# Patient Record
Sex: FEMALE | Race: WHITE | NOT HISPANIC OR LATINO | Employment: FULL TIME
[De-identification: names, ages, dates, MRNs, and addresses within clinical notes are randomized per-mention and may not be internally consistent; named-entity substitution may affect disease eponyms.]

---

## 2017-08-05 ENCOUNTER — HEALTH MAINTENANCE LETTER (OUTPATIENT)
Age: 52
End: 2017-08-05

## 2018-08-12 ENCOUNTER — HEALTH MAINTENANCE LETTER (OUTPATIENT)
Age: 53
End: 2018-08-12

## 2022-07-16 ENCOUNTER — ANESTHESIA (OUTPATIENT)
Dept: EMERGENCY MEDICINE | Facility: CLINIC | Age: 57
End: 2022-07-16
Payer: COMMERCIAL

## 2022-07-16 ENCOUNTER — HOSPITAL ENCOUNTER (EMERGENCY)
Facility: CLINIC | Age: 57
Discharge: ANOTHER HOSPITAL | End: 2022-07-16
Attending: FAMILY MEDICINE | Admitting: FAMILY MEDICINE
Payer: COMMERCIAL

## 2022-07-16 ENCOUNTER — ANESTHESIA EVENT (OUTPATIENT)
Dept: EMERGENCY MEDICINE | Facility: CLINIC | Age: 57
End: 2022-07-16
Payer: COMMERCIAL

## 2022-07-16 ENCOUNTER — HOSPITAL ENCOUNTER (INPATIENT)
Facility: CLINIC | Age: 57
LOS: 3 days | Discharge: HOME | DRG: 468 | End: 2022-07-20
Attending: INTERNAL MEDICINE | Admitting: INTERNAL MEDICINE
Payer: COMMERCIAL

## 2022-07-16 ENCOUNTER — APPOINTMENT (OUTPATIENT)
Dept: GENERAL RADIOLOGY | Facility: CLINIC | Age: 57
End: 2022-07-16
Attending: FAMILY MEDICINE
Payer: COMMERCIAL

## 2022-07-16 VITALS
HEIGHT: 63 IN | OXYGEN SATURATION: 99 % | SYSTOLIC BLOOD PRESSURE: 150 MMHG | BODY MASS INDEX: 37.03 KG/M2 | DIASTOLIC BLOOD PRESSURE: 90 MMHG | WEIGHT: 209 LBS | TEMPERATURE: 98.8 F | HEART RATE: 72 BPM | RESPIRATION RATE: 12 BRPM

## 2022-07-16 DIAGNOSIS — S83.124A: ICD-10-CM

## 2022-07-16 DIAGNOSIS — T84.012A FAILED TOTAL KNEE, RIGHT, INITIAL ENCOUNTER (H): Primary | ICD-10-CM

## 2022-07-16 PROBLEM — S83.106A: Status: ACTIVE | Noted: 2022-07-16

## 2022-07-16 LAB
ALBUMIN SERPL-MCNC: 3.4 G/DL (ref 3.5–5)
ALP SERPL-CCNC: 53 U/L (ref 45–120)
ALT SERPL W P-5'-P-CCNC: 15 U/L (ref 0–45)
ANION GAP SERPL CALCULATED.3IONS-SCNC: 8 MMOL/L (ref 5–18)
AST SERPL W P-5'-P-CCNC: 28 U/L (ref 0–40)
BASOPHILS # BLD AUTO: 0 10E3/UL (ref 0–0.2)
BASOPHILS NFR BLD AUTO: 0 %
BILIRUB SERPL-MCNC: 0.9 MG/DL (ref 0–1)
BUN SERPL-MCNC: 13 MG/DL (ref 8–22)
CALCIUM SERPL-MCNC: 8.4 MG/DL (ref 8.5–10.5)
CHLORIDE BLD-SCNC: 105 MMOL/L (ref 98–107)
CO2 SERPL-SCNC: 28 MMOL/L (ref 22–31)
CREAT SERPL-MCNC: 0.78 MG/DL (ref 0.6–1.1)
EOSINOPHIL # BLD AUTO: 0 10E3/UL (ref 0–0.7)
EOSINOPHIL NFR BLD AUTO: 0 %
ERYTHROCYTE [DISTWIDTH] IN BLOOD BY AUTOMATED COUNT: 12.9 % (ref 10–15)
GFR SERPL CREATININE-BSD FRML MDRD: 89 ML/MIN/1.73M2
GLUCOSE BLD-MCNC: 93 MG/DL (ref 70–125)
HCT VFR BLD AUTO: 32.4 % (ref 35–47)
HGB BLD-MCNC: 10.6 G/DL (ref 11.7–15.7)
IMM GRANULOCYTES # BLD: 0.1 10E3/UL
IMM GRANULOCYTES NFR BLD: 1 %
LYMPHOCYTES # BLD AUTO: 3.3 10E3/UL (ref 0.8–5.3)
LYMPHOCYTES NFR BLD AUTO: 33 %
MAGNESIUM SERPL-MCNC: 1.7 MG/DL (ref 1.8–2.6)
MCH RBC QN AUTO: 29.2 PG (ref 26.5–33)
MCHC RBC AUTO-ENTMCNC: 32.7 G/DL (ref 31.5–36.5)
MCV RBC AUTO: 89 FL (ref 78–100)
MONOCYTES # BLD AUTO: 0.5 10E3/UL (ref 0–1.3)
MONOCYTES NFR BLD AUTO: 5 %
NEUTROPHILS # BLD AUTO: 6 10E3/UL (ref 1.6–8.3)
NEUTROPHILS NFR BLD AUTO: 61 %
NRBC # BLD AUTO: 0 10E3/UL
NRBC BLD AUTO-RTO: 0 /100
PLATELET # BLD AUTO: 166 10E3/UL (ref 150–450)
POTASSIUM BLD-SCNC: 4.2 MMOL/L (ref 3.5–5)
PROT SERPL-MCNC: 6.4 G/DL (ref 6–8)
RBC # BLD AUTO: 3.63 10E6/UL (ref 3.8–5.2)
SARS-COV-2 RNA RESP QL NAA+PROBE: NEGATIVE
SODIUM SERPL-SCNC: 141 MMOL/L (ref 136–145)
WBC # BLD AUTO: 10 10E3/UL (ref 4–11)

## 2022-07-16 PROCEDURE — 83735 ASSAY OF MAGNESIUM: CPT | Performed by: INTERNAL MEDICINE

## 2022-07-16 PROCEDURE — 27550 TREAT KNEE DISLOCATION: CPT | Mod: RT | Performed by: FAMILY MEDICINE

## 2022-07-16 PROCEDURE — 85025 COMPLETE CBC W/AUTO DIFF WBC: CPT | Performed by: INTERNAL MEDICINE

## 2022-07-16 PROCEDURE — 250N000011 HC RX IP 250 OP 636: Performed by: FAMILY MEDICINE

## 2022-07-16 PROCEDURE — 370N000003 HC ANESTHESIA WARD SERVICE

## 2022-07-16 PROCEDURE — 99285 EMERGENCY DEPT VISIT HI MDM: CPT | Mod: 25 | Performed by: FAMILY MEDICINE

## 2022-07-16 PROCEDURE — 258N000003 HC RX IP 258 OP 636: Performed by: INTERNAL MEDICINE

## 2022-07-16 PROCEDURE — 36415 COLL VENOUS BLD VENIPUNCTURE: CPT | Performed by: INTERNAL MEDICINE

## 2022-07-16 PROCEDURE — C9803 HOPD COVID-19 SPEC COLLECT: HCPCS | Performed by: FAMILY MEDICINE

## 2022-07-16 PROCEDURE — 99223 1ST HOSP IP/OBS HIGH 75: CPT | Mod: AI | Performed by: INTERNAL MEDICINE

## 2022-07-16 PROCEDURE — 999N000065 XR KNEE RT 1 /2 VW

## 2022-07-16 PROCEDURE — G0378 HOSPITAL OBSERVATION PER HR: HCPCS

## 2022-07-16 PROCEDURE — 80053 COMPREHEN METABOLIC PANEL: CPT | Performed by: INTERNAL MEDICINE

## 2022-07-16 PROCEDURE — 250N000011 HC RX IP 250 OP 636: Performed by: NURSE ANESTHETIST, CERTIFIED REGISTERED

## 2022-07-16 PROCEDURE — 87635 SARS-COV-2 COVID-19 AMP PRB: CPT | Performed by: FAMILY MEDICINE

## 2022-07-16 PROCEDURE — 73560 X-RAY EXAM OF KNEE 1 OR 2: CPT | Mod: RT

## 2022-07-16 RX ORDER — HYDROMORPHONE HYDROCHLORIDE 1 MG/ML
0.5 INJECTION, SOLUTION INTRAMUSCULAR; INTRAVENOUS; SUBCUTANEOUS
Status: COMPLETED | OUTPATIENT
Start: 2022-07-16 | End: 2022-07-16

## 2022-07-16 RX ORDER — ONDANSETRON 2 MG/ML
4 INJECTION INTRAMUSCULAR; INTRAVENOUS EVERY 6 HOURS PRN
Status: DISCONTINUED | OUTPATIENT
Start: 2022-07-16 | End: 2022-07-18

## 2022-07-16 RX ORDER — HYDROXYZINE HYDROCHLORIDE 10 MG/1
10 TABLET, FILM COATED ORAL 3 TIMES DAILY PRN
Status: DISCONTINUED | OUTPATIENT
Start: 2022-07-16 | End: 2022-07-19

## 2022-07-16 RX ORDER — AMOXICILLIN 250 MG
1 CAPSULE ORAL 2 TIMES DAILY PRN
Status: DISCONTINUED | OUTPATIENT
Start: 2022-07-16 | End: 2022-07-18

## 2022-07-16 RX ORDER — ACETAMINOPHEN 650 MG/1
650 SUPPOSITORY RECTAL EVERY 6 HOURS PRN
Status: DISCONTINUED | OUTPATIENT
Start: 2022-07-16 | End: 2022-07-18

## 2022-07-16 RX ORDER — BISACODYL 10 MG
10 SUPPOSITORY, RECTAL RECTAL DAILY PRN
Status: DISCONTINUED | OUTPATIENT
Start: 2022-07-16 | End: 2022-07-18

## 2022-07-16 RX ORDER — NALOXONE HYDROCHLORIDE 0.4 MG/ML
0.2 INJECTION, SOLUTION INTRAMUSCULAR; INTRAVENOUS; SUBCUTANEOUS
Status: DISCONTINUED | OUTPATIENT
Start: 2022-07-16 | End: 2022-07-20 | Stop reason: HOSPADM

## 2022-07-16 RX ORDER — ACETAMINOPHEN 500 MG
1000 TABLET ORAL EVERY 6 HOURS PRN
Status: ON HOLD | COMMUNITY
End: 2022-07-20

## 2022-07-16 RX ORDER — ASPIRIN 81 MG/1
81 TABLET, CHEWABLE ORAL DAILY
Status: ON HOLD | COMMUNITY
End: 2022-07-20

## 2022-07-16 RX ORDER — POLYETHYLENE GLYCOL 3350 17 G/17G
1 POWDER, FOR SOLUTION ORAL DAILY
COMMUNITY

## 2022-07-16 RX ORDER — NALOXONE HYDROCHLORIDE 0.4 MG/ML
0.4 INJECTION, SOLUTION INTRAMUSCULAR; INTRAVENOUS; SUBCUTANEOUS
Status: DISCONTINUED | OUTPATIENT
Start: 2022-07-16 | End: 2022-07-20 | Stop reason: HOSPADM

## 2022-07-16 RX ORDER — OXYCODONE HYDROCHLORIDE 5 MG/1
5-10 TABLET ORAL EVERY 4 HOURS PRN
Status: ON HOLD | COMMUNITY
End: 2022-07-20

## 2022-07-16 RX ORDER — ACETAMINOPHEN 325 MG/1
650 TABLET ORAL EVERY 6 HOURS PRN
Status: DISCONTINUED | OUTPATIENT
Start: 2022-07-16 | End: 2022-07-18

## 2022-07-16 RX ORDER — SODIUM CHLORIDE 9 MG/ML
INJECTION, SOLUTION INTRAVENOUS CONTINUOUS
Status: DISCONTINUED | OUTPATIENT
Start: 2022-07-16 | End: 2022-07-17

## 2022-07-16 RX ORDER — IBUPROFEN 800 MG/1
800 TABLET, FILM COATED ORAL EVERY 8 HOURS PRN
COMMUNITY

## 2022-07-16 RX ORDER — OXYCODONE HYDROCHLORIDE 10 MG/1
5-10 TABLET ORAL EVERY 4 HOURS PRN
Status: ON HOLD | COMMUNITY
End: 2022-07-16

## 2022-07-16 RX ORDER — ONDANSETRON 2 MG/ML
4 INJECTION INTRAMUSCULAR; INTRAVENOUS
Status: DISCONTINUED | OUTPATIENT
Start: 2022-07-16 | End: 2022-07-16 | Stop reason: HOSPADM

## 2022-07-16 RX ORDER — ONDANSETRON 4 MG/1
4 TABLET, ORALLY DISINTEGRATING ORAL EVERY 6 HOURS PRN
Status: DISCONTINUED | OUTPATIENT
Start: 2022-07-16 | End: 2022-07-18

## 2022-07-16 RX ORDER — AMOXICILLIN 250 MG
2 CAPSULE ORAL 2 TIMES DAILY PRN
Status: DISCONTINUED | OUTPATIENT
Start: 2022-07-16 | End: 2022-07-17

## 2022-07-16 RX ORDER — HYDROCODONE BITARTRATE AND ACETAMINOPHEN 5; 325 MG/1; MG/1
1 TABLET ORAL EVERY 4 HOURS PRN
Status: DISCONTINUED | OUTPATIENT
Start: 2022-07-16 | End: 2022-07-17

## 2022-07-16 RX ORDER — NICOTINE POLACRILEX 4 MG/1
20 GUM, CHEWING ORAL DAILY
COMMUNITY

## 2022-07-16 RX ORDER — PROPOFOL 10 MG/ML
INJECTION, EMULSION INTRAVENOUS PRN
Status: DISCONTINUED | OUTPATIENT
Start: 2022-07-16 | End: 2022-07-16

## 2022-07-16 RX ORDER — HYDROMORPHONE HYDROCHLORIDE 1 MG/ML
0.3 INJECTION, SOLUTION INTRAMUSCULAR; INTRAVENOUS; SUBCUTANEOUS EVERY 4 HOURS PRN
Status: DISCONTINUED | OUTPATIENT
Start: 2022-07-16 | End: 2022-07-18

## 2022-07-16 RX ADMIN — SODIUM CHLORIDE: 9 INJECTION, SOLUTION INTRAVENOUS at 22:58

## 2022-07-16 RX ADMIN — HYDROMORPHONE HYDROCHLORIDE 0.5 MG: 1 INJECTION, SOLUTION INTRAMUSCULAR; INTRAVENOUS; SUBCUTANEOUS at 18:41

## 2022-07-16 RX ADMIN — HYDROMORPHONE HYDROCHLORIDE 0.5 MG: 1 INJECTION, SOLUTION INTRAMUSCULAR; INTRAVENOUS; SUBCUTANEOUS at 19:00

## 2022-07-16 RX ADMIN — MIDAZOLAM 2 MG: 1 INJECTION INTRAMUSCULAR; INTRAVENOUS at 18:14

## 2022-07-16 RX ADMIN — PROPOFOL 100 MG: 10 INJECTION, EMULSION INTRAVENOUS at 18:17

## 2022-07-16 RX ADMIN — HYDROMORPHONE HYDROCHLORIDE 0.5 MG: 1 INJECTION, SOLUTION INTRAMUSCULAR; INTRAVENOUS; SUBCUTANEOUS at 18:34

## 2022-07-16 NOTE — ANESTHESIA PREPROCEDURE EVALUATION
Anesthesia Pre-Procedure Evaluation    Patient: Kristen Graff   MRN: [id number] : 1965        Procedure : * No procedures listed *          Past Medical History:   Diagnosis Date     Leiomyoma of uterus, unspecified      Unspecified hypothyroidism       Past Surgical History:   Procedure Laterality Date     C/SECTION, LOW TRANSVERSE       D & C       HYSTERECTOMY, PAP NO LONGER INDICATED  2008      No Known Allergies   Social History     Tobacco Use     Smoking status: Never Smoker     Smokeless tobacco: Not on file   Substance Use Topics     Alcohol use: No      Wt Readings from Last 1 Encounters:   22 94.8 kg (209 lb)        Anesthesia Evaluation   Pt has had prior anesthetic. Type: General.        ROS/MED HX  ENT/Pulmonary:  - neg pulmonary ROS     Neurologic:  - neg neurologic ROS     Cardiovascular:       METS/Exercise Tolerance:     Hematologic:  - neg hematologic  ROS     Musculoskeletal:  - neg musculoskeletal ROS     GI/Hepatic:     (+) GERD,     Renal/Genitourinary:  - neg Renal ROS     Endo:     (+) thyroid problem, hypothyroidism,     Psychiatric/Substance Use:  - neg psychiatric ROS     Infectious Disease:  - neg infectious disease ROS     Malignancy:  - neg malignancy ROS     Other:            Physical Exam    Airway        Mallampati: II   TM distance: > 3 FB   Neck ROM: full   Mouth opening: > 3 cm    Respiratory Devices and Support         Dental  no notable dental history         Cardiovascular   cardiovascular exam normal          Pulmonary   pulmonary exam normal                OUTSIDE LABS:  CBC:   Lab Results   Component Value Date    WBC 7.6 2008    HGB 9.9 (L) 01/15/2008    HGB 11.4 (L) 2008    HCT 31.2 (L) 01/15/2008    HCT 36.3 2008     2008     BMP:   Lab Results   Component Value Date     2006     COAGS: No results found for: PTT, INR, FIBR  POC:   Lab Results   Component Value Date    HCG Negative 2008      HEPATIC: No results found for: ALBUMIN, PROTTOTAL, ALT, AST, GGT, ALKPHOS, BILITOTAL, BILIDIRECT, LAUREN  OTHER:   Lab Results   Component Value Date    A1C 5.8 02/08/2010    TSH 0.55 03/21/2011    T4 1.00 01/11/2008       Anesthesia Plan    ASA Status:  2      Anesthesia Type: General.   Induction: Intravenous.           Consents    Anesthesia Plan(s) and associated risks, benefits, and realistic alternatives discussed. Questions answered and patient/representative(s) expressed understanding.    - Discussed:     - Discussed with:  Patient         Postoperative Care    Pain management: IV analgesics, Oral pain medications.   PONV prophylaxis: Ondansetron (or other 5HT-3)     Comments:                CLAYTON Mata CRNA

## 2022-07-16 NOTE — ED PROVIDER NOTES
HPI   The patient is a 57-year-old female presenting with concern for right knee dislocation.  She had an arthroplasty just 3 days ago.  Shortly after the arthroplasty she had a dislocation of the knee.  This came on after she was upright and bending over toward the front at her waist.  It caused her right knee to buckle and hyperextend.  This resulted in a dislocation of the knee arthroplasty with the inferior segment posterior to the superior segment.  The patient tells me that she went back into surgery and a portion of the superior joint was replaced.  Unfortunately, today she was up again and bending over forward when she had recurrent pain and movement of the knee consistent with what she experienced previously.  She took oxycodone this morning.  She had fentanyl in route.  She currently tells me that her pain is increasing and its becoming more uncomfortable.  No other injury reported.        Allergies:  No Known Allergies  Problem List:    Patient Active Problem List    Diagnosis Date Noted     CARDIOVASCULAR SCREENING; LDL GOAL LESS THAN 160 10/31/2010     Priority: Medium     Annual physical exam 08/18/2010     Priority: Medium     Left lower quadrant pain 08/18/2010     Priority: Medium     Esophageal reflux 04/06/2006     Priority: Medium     Hypothyroidism 12/14/2005     Priority: Medium     March 22, 2011 TSH 0.55, on synthyroid 112 mcg, one year refill at current dose.   Problem list name updated by automated process. Provider to review        Past Medical History:    Past Medical History:   Diagnosis Date     Leiomyoma of uterus, unspecified      Unspecified hypothyroidism      Past Surgical History:    Past Surgical History:   Procedure Laterality Date     C/SECTION, LOW TRANSVERSE  1992     D & C  1992     HYSTERECTOMY, PAP NO LONGER INDICATED  1-     Family History:    Family History   Problem Relation Age of Onset     Diabetes Sister         Adult onset     Heart Disease Maternal  "Grandfather          of heart attack     C.A.D. Maternal Grandfather      Breast Cancer Maternal Aunt         Dx in her 50's     Diabetes Father         Adult onset     Eye Disorder Father         some type of retina issue     Thyroid Disease Father      Cancer Paternal Uncle         bladder     Breast Cancer Mother      Diabetes Paternal Uncle      Social History:  Marital Status:   [2]  Social History     Tobacco Use     Smoking status: Never Smoker   Substance Use Topics     Alcohol use: No     Drug use: No      Medications:    acetaminophen (TYLENOL) 500 MG tablet  aspirin (ASA) 81 MG chewable tablet  ibuprofen (ADVIL/MOTRIN) 800 MG tablet  levothyroxine (SYNTHROID) 112 MCG tablet  omeprazole 20 MG tablet  oxyCODONE IR (ROXICODONE) 10 MG tablet  polyethylene glycol (MIRALAX) 17 g packet      Review of Systems   All other systems reviewed and are negative.      PE   BP: (!) 158/82  Pulse: 74  Temp: 98.8  F (37.1  C)  Resp: 14  Height: 160 cm (5' 3\")  Weight: 94.8 kg (209 lb)  SpO2: 96 %  Physical Exam  Vitals reviewed.   Constitutional:       Appearance: She is well-developed.      Comments: Cooperative, pleasant, mild discomfort.  Sitting up in bed with her legs in front of her.  There is a large knee immobilizer on the right.   HENT:      Head: Normocephalic and atraumatic.      Right Ear: External ear normal.      Left Ear: External ear normal.      Nose: Nose normal.      Mouth/Throat:      Mouth: Mucous membranes are moist.      Pharynx: Oropharynx is clear.   Eyes:      Extraocular Movements: Extraocular movements intact.      Conjunctiva/sclera: Conjunctivae normal.      Pupils: Pupils are equal, round, and reactive to light.   Cardiovascular:      Rate and Rhythm: Normal rate and regular rhythm.   Pulmonary:      Effort: Pulmonary effort is normal.   Musculoskeletal:      Cervical back: Normal range of motion.      Comments: There is an obvious deformity involving the right knee, despite the " "knee immobilizer being on the knee.  She has a large amount of swelling on the anterior aspect.  She has tenderness in this area.  The right foot is warm to the touch and has brisk capillary refill.   Skin:     General: Skin is warm and dry.   Neurological:      Mental Status: She is alert and oriented to person, place, and time.   Psychiatric:         Behavior: Behavior normal.         ED COURSE and [facility]   1659.  Concern for knee dislocation.  X-ray pending.  Dilaudid ordered.  I will reach out to [facility] orthopedics prior to reduction.  Her knee surgeon was Dr. Temple.    1830.  I spoke with Dr. Barrientos at [facility].  He recommended reduction and then a posterior splint.  The procedure is documented below.  Postreduction x-ray still pending.  The patient is demanding admission to the hospital because she is unsafe at home.  She did clarify her history and told me that she had her dislocation occur with her knee immobilizer on and without weightbearing.  She was simply trying to bend forward while sitting and loosen the knee immobilizer when it slid out of place.  She lives in a home with stairs.  She does not think she is able to accomplish this and is requesting hospitalization and ultimately definitive treatment.  I think this is reasonable given her situation.    1957.  The patient tried to move in bed when she felt a slide or \"clunk\" in her right knee.  She does have swelling of the knee but it is difficult to appreciate if the knee has dislocated again.  I suspect not.  She is not with significant pain at this time.  Her foot is warm.  Capillary refill is brisk.  No repeat imaging at this time.  The patient is excepted by the hospitalist at [facility], Dr. Ochoa.    LABS  Labs Ordered and Resulted from Time of ED Arrival to Time of ED Departure   COVID-19 VIRUS (CORONAVIRUS) BY PCR - Normal       Result Value    SARS CoV2 PCR Negative         IMAGING  Images reviewed by me.  Radiology report also " reviewed.  XR Knee Right 1/2 Views   Final Result   IMPRESSION: Postoperative changes of total knee arthroplasty and patellar resurfacing. Interval reduction of the dislocation seen on the previous examination. Post procedural air within the joint and surrounding soft tissues.      XR Knee Right 1/2 Views   Final Result   IMPRESSION: Postoperative changes of total knee arthroplasty. Patellar resurfacing. Posterior dislocation at the knee joint. Post procedural air within the joint.          [facility]    -Dislocation - Lower Extremity    Date/Time: 7/16/2022 6:37 PM  Performed by: Jonny Gil MD  Authorized by: Jonny Gil MD     Risks, benefits and alternatives discussed.      LOCATION     Location:  Knee    Knee injury location:  R knee    Knee dislocation type: posterior      PRE PROCEDURE DETAILS:     Distal perfusion: normal      Range of motion: reduced      ANESTHESIA (see MAR for exact dosages)      Anesthesia method: Procedural sedation, anesthesia consultation.    PROCEDURE DETAILS      Manipulation performed: yes      Knee reduction method:  Direct traction    Reduction successful: yes      Reduction confirmed with imaging: yes      Immobilization:  Splint    Splint type: Full-length right lower extremity splint.    Supplies used:  Prefabricated splint    POST PROCEDURE DETAILS      Neurological function: normal      Distal perfusion: normal      Range of motion: improved        PROCEDURE    Patient Tolerance:  Patient tolerated the procedure well with no immediate complications      Medications   ondansetron (ZOFRAN) injection 4 mg (has no administration in time range)   HYDROmorphone (PF) (DILAUDID) injection 0.5 mg (0.5 mg Intravenous Given 7/16/22 1900)         IMPRESSION       ICD-10-CM    1. Posterior disloc of proximal end of tibia, right knee, init  S83.124A             Medication List      There are no discharge medications for this visit.                      Jonny Gil MD  07/16/22 1958

## 2022-07-16 NOTE — ED TRIAGE NOTES
Patient had surgery on 14th right total knee, on the 15th the knee popped back out and today she comes in because the knee is out of joint again. Patient has knee immobilizer on and has noted deformity in the operative knee.     ANNA Temple      Triage Assessment     Row Name 07/16/22 1527       Triage Assessment (Adult)    Airway WDL WDL       Respiratory WDL    Respiratory WDL WDL       Skin Circulation/Temperature WDL    Skin Circulation/Temperature WDL --  right knee surgical incision       Cardiac WDL    Cardiac WDL WDL       Peripheral/Neurovascular WDL    Peripheral Neurovascular WDL pulse assessment    Pulse Assessment posterior tibial;dorsalis pedis       Pulse Dorsalis Pedis    Left Dorsalis Pedis Pulse 2+ (normal)    Right Dorsalis Pedis Pulse 2+ (normal)       Pulse Posterior Tibial    Left Posterior Tibial Pulse 2+ (normal)    Right Posterior Tibial Pulse 2+ (normal)       Cognitive/Neuro/Behavioral WDL    Cognitive/Neuro/Behavioral WDL WDL

## 2022-07-16 NOTE — ANESTHESIA POSTPROCEDURE EVALUATION
Patient: Kristen Graff    Procedure: * No procedures listed *       Anesthesia Type:  General    Note:  Disposition: Outpatient   Postop Pain Control: Uneventful            Sign Out: Well controlled pain   PONV: No   Neuro/Psych: Uneventful            Sign Out: Acceptable/Baseline neuro status   Airway/Respiratory: Uneventful            Sign Out: Acceptable/Baseline resp. status   CV/Hemodynamics: Uneventful            Sign Out: Acceptable CV status; No obvious hypovolemia; No obvious fluid overload   Other NRE: NONE   DID A NON-ROUTINE EVENT OCCUR? No           Last vitals:  Vitals:    07/16/22 1525 07/16/22 1815 07/16/22 1826   BP: (!) 158/82 (!) 150/90    Pulse: 74 72    Resp: 14 10 12   Temp: 37.1  C (98.8  F)     SpO2: 96% 100% 99%       Electronically Signed By: CLAYTON Mata CRNA  July 16, 2022  6:29 PM

## 2022-07-16 NOTE — ANESTHESIA CARE TRANSFER NOTE
Patient: Kristen Graff    Procedure: * No procedures listed *       Diagnosis: * No pre-op diagnosis entered *  Diagnosis Additional Information: No value filed.    Anesthesia Type:   General     Note:      Level of Consciousness: awake  Oxygen Supplementation: nasal cannula    Independent Airway: airway patency satisfactory and stable    Vital Signs Stable: post-procedure vital signs reviewed and stable  Report to RN Given: handoff report given  Patient transferred to: Emergency Department    Handoff Report: Identifed the Patient, Identified the Reponsible Provider, Reviewed the pertinent medical history, Discussed the surgical course, Reviewed Intra-OP anesthesia mangement and issues during anesthesia, Set expectations for post-procedure period and Allowed opportunity for questions and acknowledgement of understanding      Vitals:  Vitals Value Taken Time   /85 07/16/22 1826   Temp     Pulse 75 07/16/22 1826   Resp 12 07/16/22 1826   SpO2 100 % 07/16/22 1828   Vitals shown include unvalidated device data.    Electronically Signed By: CLAYTON Mata CRNA  July 16, 2022  6:29 PM

## 2022-07-17 ENCOUNTER — APPOINTMENT (OUTPATIENT)
Dept: RADIOLOGY | Facility: CLINIC | Age: 57
DRG: 468 | End: 2022-07-17
Attending: INTERNAL MEDICINE
Payer: COMMERCIAL

## 2022-07-17 PROBLEM — Z96.641 STATUS POST TOTAL HIP REPLACEMENT, RIGHT: Status: ACTIVE | Noted: 2022-07-17

## 2022-07-17 PROBLEM — T84.012A FAILED TOTAL KNEE, RIGHT, INITIAL ENCOUNTER (H): Status: ACTIVE | Noted: 2022-07-17

## 2022-07-17 PROBLEM — R03.0 ELEVATED BLOOD PRESSURE READING WITHOUT DIAGNOSIS OF HYPERTENSION: Status: ACTIVE | Noted: 2022-07-17

## 2022-07-17 PROBLEM — Z92.89 H/O ECHOCARDIOGRAM: Status: ACTIVE | Noted: 2022-07-17

## 2022-07-17 PROBLEM — Z96.641 STATUS POST TOTAL HIP REPLACEMENT, RIGHT: Chronic | Status: ACTIVE | Noted: 2022-07-17

## 2022-07-17 PROBLEM — S83.106A: Chronic | Status: ACTIVE | Noted: 2022-07-16

## 2022-07-17 LAB
ALBUMIN SERPL-MCNC: 3.4 G/DL (ref 3.5–5)
ALP SERPL-CCNC: 56 U/L (ref 45–120)
ALT SERPL W P-5'-P-CCNC: 15 U/L (ref 0–45)
ANION GAP SERPL CALCULATED.3IONS-SCNC: 9 MMOL/L (ref 5–18)
AST SERPL W P-5'-P-CCNC: 28 U/L (ref 0–40)
BASOPHILS # BLD AUTO: 0 10E3/UL (ref 0–0.2)
BASOPHILS NFR BLD AUTO: 0 %
BILIRUB SERPL-MCNC: 1.5 MG/DL (ref 0–1)
BUN SERPL-MCNC: 10 MG/DL (ref 8–22)
CALCIUM SERPL-MCNC: 8.7 MG/DL (ref 8.5–10.5)
CHLORIDE BLD-SCNC: 101 MMOL/L (ref 98–107)
CO2 SERPL-SCNC: 28 MMOL/L (ref 22–31)
CREAT SERPL-MCNC: 0.77 MG/DL (ref 0.6–1.1)
EOSINOPHIL # BLD AUTO: 0.1 10E3/UL (ref 0–0.7)
EOSINOPHIL NFR BLD AUTO: 1 %
ERYTHROCYTE [DISTWIDTH] IN BLOOD BY AUTOMATED COUNT: 13 % (ref 10–15)
GFR SERPL CREATININE-BSD FRML MDRD: >90 ML/MIN/1.73M2
GLUCOSE BLD-MCNC: 98 MG/DL (ref 70–125)
HCT VFR BLD AUTO: 33.3 % (ref 35–47)
HGB BLD-MCNC: 10.8 G/DL (ref 11.7–15.7)
HGB BLD-MCNC: 11.7 G/DL (ref 11.7–15.7)
IMM GRANULOCYTES # BLD: 0.1 10E3/UL
IMM GRANULOCYTES NFR BLD: 1 %
LYMPHOCYTES # BLD AUTO: 2.2 10E3/UL (ref 0.8–5.3)
LYMPHOCYTES NFR BLD AUTO: 23 %
MAGNESIUM SERPL-MCNC: 1.7 MG/DL (ref 1.8–2.6)
MCH RBC QN AUTO: 28.8 PG (ref 26.5–33)
MCHC RBC AUTO-ENTMCNC: 32.4 G/DL (ref 31.5–36.5)
MCV RBC AUTO: 89 FL (ref 78–100)
MONOCYTES # BLD AUTO: 0.6 10E3/UL (ref 0–1.3)
MONOCYTES NFR BLD AUTO: 6 %
NEUTROPHILS # BLD AUTO: 6.5 10E3/UL (ref 1.6–8.3)
NEUTROPHILS NFR BLD AUTO: 69 %
NRBC # BLD AUTO: 0 10E3/UL
NRBC BLD AUTO-RTO: 0 /100
PLATELET # BLD AUTO: 163 10E3/UL (ref 150–450)
POTASSIUM BLD-SCNC: 3.8 MMOL/L (ref 3.5–5)
PROT SERPL-MCNC: 6.7 G/DL (ref 6–8)
RBC # BLD AUTO: 3.75 10E6/UL (ref 3.8–5.2)
SODIUM SERPL-SCNC: 138 MMOL/L (ref 136–145)
WBC # BLD AUTO: 9.4 10E3/UL (ref 4–11)

## 2022-07-17 PROCEDURE — 36415 COLL VENOUS BLD VENIPUNCTURE: CPT | Performed by: ORTHOPAEDIC SURGERY

## 2022-07-17 PROCEDURE — 85018 HEMOGLOBIN: CPT | Performed by: ORTHOPAEDIC SURGERY

## 2022-07-17 PROCEDURE — 85025 COMPLETE CBC W/AUTO DIFF WBC: CPT | Performed by: INTERNAL MEDICINE

## 2022-07-17 PROCEDURE — G0378 HOSPITAL OBSERVATION PER HR: HCPCS

## 2022-07-17 PROCEDURE — 36415 COLL VENOUS BLD VENIPUNCTURE: CPT | Performed by: INTERNAL MEDICINE

## 2022-07-17 PROCEDURE — 80053 COMPREHEN METABOLIC PANEL: CPT | Performed by: INTERNAL MEDICINE

## 2022-07-17 PROCEDURE — 96372 THER/PROPH/DIAG INJ SC/IM: CPT | Mod: XU | Performed by: INTERNAL MEDICINE

## 2022-07-17 PROCEDURE — 96374 THER/PROPH/DIAG INJ IV PUSH: CPT | Mod: XU

## 2022-07-17 PROCEDURE — 250N000011 HC RX IP 250 OP 636: Performed by: INTERNAL MEDICINE

## 2022-07-17 PROCEDURE — 99225 PR SUBSEQUENT OBSERVATION CARE,LEVEL II: CPT | Performed by: INTERNAL MEDICINE

## 2022-07-17 PROCEDURE — 250N000013 HC RX MED GY IP 250 OP 250 PS 637: Performed by: INTERNAL MEDICINE

## 2022-07-17 PROCEDURE — 83735 ASSAY OF MAGNESIUM: CPT | Performed by: INTERNAL MEDICINE

## 2022-07-17 PROCEDURE — 73560 X-RAY EXAM OF KNEE 1 OR 2: CPT | Mod: RT

## 2022-07-17 PROCEDURE — 120N000001 HC R&B MED SURG/OB

## 2022-07-17 PROCEDURE — 250N000013 HC RX MED GY IP 250 OP 250 PS 637: Performed by: HOSPITALIST

## 2022-07-17 RX ORDER — MAGNESIUM OXIDE 400 MG/1
400 TABLET ORAL 2 TIMES DAILY
Status: COMPLETED | OUTPATIENT
Start: 2022-07-17 | End: 2022-07-17

## 2022-07-17 RX ORDER — POLYETHYLENE GLYCOL 3350 17 G/17G
17 POWDER, FOR SOLUTION ORAL DAILY
Status: DISCONTINUED | OUTPATIENT
Start: 2022-07-17 | End: 2022-07-18

## 2022-07-17 RX ORDER — SODIUM CHLORIDE 9 MG/ML
INJECTION, SOLUTION INTRAVENOUS CONTINUOUS
Status: DISCONTINUED | OUTPATIENT
Start: 2022-07-18 | End: 2022-07-17

## 2022-07-17 RX ORDER — NICOTINE POLACRILEX 4 MG/1
20 GUM, CHEWING ORAL DAILY
Status: DISCONTINUED | OUTPATIENT
Start: 2022-07-17 | End: 2022-07-17

## 2022-07-17 RX ORDER — ENOXAPARIN SODIUM 100 MG/ML
40 INJECTION SUBCUTANEOUS ONCE
Status: COMPLETED | OUTPATIENT
Start: 2022-07-17 | End: 2022-07-17

## 2022-07-17 RX ORDER — HYDRALAZINE HYDROCHLORIDE 25 MG/1
25 TABLET, FILM COATED ORAL EVERY 4 HOURS PRN
Status: DISCONTINUED | OUTPATIENT
Start: 2022-07-17 | End: 2022-07-20 | Stop reason: HOSPADM

## 2022-07-17 RX ORDER — HYDROCODONE BITARTRATE AND ACETAMINOPHEN 5; 325 MG/1; MG/1
1-2 TABLET ORAL EVERY 4 HOURS PRN
Status: DISCONTINUED | OUTPATIENT
Start: 2022-07-17 | End: 2022-07-18

## 2022-07-17 RX ORDER — ACETAMINOPHEN 500 MG
1000 TABLET ORAL EVERY 6 HOURS PRN
Status: DISCONTINUED | OUTPATIENT
Start: 2022-07-17 | End: 2022-07-17

## 2022-07-17 RX ORDER — LEVOTHYROXINE SODIUM 112 UG/1
112 TABLET ORAL DAILY
Status: DISCONTINUED | OUTPATIENT
Start: 2022-07-17 | End: 2022-07-20 | Stop reason: HOSPADM

## 2022-07-17 RX ORDER — PANTOPRAZOLE SODIUM 20 MG/1
40 TABLET, DELAYED RELEASE ORAL
Status: DISCONTINUED | OUTPATIENT
Start: 2022-07-17 | End: 2022-07-20 | Stop reason: HOSPADM

## 2022-07-17 RX ORDER — OXYCODONE HYDROCHLORIDE 5 MG/1
5-10 TABLET ORAL EVERY 4 HOURS PRN
Status: DISCONTINUED | OUTPATIENT
Start: 2022-07-17 | End: 2022-07-17

## 2022-07-17 RX ADMIN — HYDROXYZINE HYDROCHLORIDE 10 MG: 10 TABLET ORAL at 07:50

## 2022-07-17 RX ADMIN — HYDROCODONE BITARTRATE AND ACETAMINOPHEN 1 TABLET: 5; 325 TABLET ORAL at 09:12

## 2022-07-17 RX ADMIN — LEVOTHYROXINE SODIUM 112 MCG: 0.11 TABLET ORAL at 14:13

## 2022-07-17 RX ADMIN — HYDROCODONE BITARTRATE AND ACETAMINOPHEN 1 TABLET: 5; 325 TABLET ORAL at 05:00

## 2022-07-17 RX ADMIN — HYDROCODONE BITARTRATE AND ACETAMINOPHEN 2 TABLET: 5; 325 TABLET ORAL at 21:32

## 2022-07-17 RX ADMIN — ENOXAPARIN SODIUM 40 MG: 100 INJECTION SUBCUTANEOUS at 09:12

## 2022-07-17 RX ADMIN — HYDROMORPHONE HYDROCHLORIDE 0.3 MG: 1 INJECTION, SOLUTION INTRAMUSCULAR; INTRAVENOUS; SUBCUTANEOUS at 19:54

## 2022-07-17 RX ADMIN — HYDROXYZINE HYDROCHLORIDE 10 MG: 10 TABLET ORAL at 14:14

## 2022-07-17 RX ADMIN — HYDROMORPHONE HYDROCHLORIDE 0.3 MG: 1 INJECTION, SOLUTION INTRAMUSCULAR; INTRAVENOUS; SUBCUTANEOUS at 03:21

## 2022-07-17 RX ADMIN — Medication 400 MG: at 09:12

## 2022-07-17 RX ADMIN — HYDROCODONE BITARTRATE AND ACETAMINOPHEN 1 TABLET: 5; 325 TABLET ORAL at 00:06

## 2022-07-17 RX ADMIN — HYDROCODONE BITARTRATE AND ACETAMINOPHEN 2 TABLET: 5; 325 TABLET ORAL at 17:30

## 2022-07-17 RX ADMIN — PANTOPRAZOLE SODIUM 40 MG: 20 TABLET, DELAYED RELEASE ORAL at 14:14

## 2022-07-17 RX ADMIN — Medication 400 MG: at 01:51

## 2022-07-17 RX ADMIN — HYDROXYZINE HYDROCHLORIDE 10 MG: 10 TABLET ORAL at 00:06

## 2022-07-17 RX ADMIN — HYDROXYZINE HYDROCHLORIDE 10 MG: 10 TABLET ORAL at 19:55

## 2022-07-17 RX ADMIN — HYDROCODONE BITARTRATE AND ACETAMINOPHEN 1 TABLET: 5; 325 TABLET ORAL at 12:57

## 2022-07-17 ASSESSMENT — ACTIVITIES OF DAILY LIVING (ADL)
ADLS_ACUITY_SCORE: 26
WALKING_OR_CLIMBING_STAIRS_DIFFICULTY: NO
BATHING: 1-->ASSISTANCE NEEDED
EQUIPMENT_CURRENTLY_USED_AT_HOME: WALKER, ROLLING
TOILETING: 1-->ASSISTANCE (EQUIPMENT/PERSON) NEEDED (NOT DEVELOPMENTALLY APPROPRIATE)
ADLS_ACUITY_SCORE: 26
ADLS_ACUITY_SCORE: 28
DRESS: 1-->ASSISTANCE (EQUIPMENT/PERSON) NEEDED
TOILETING_ISSUES: NO
ADLS_ACUITY_SCORE: 26
DOING_ERRANDS_INDEPENDENTLY_DIFFICULTY: NO
DIFFICULTY_EATING/SWALLOWING: NO
WEAR_GLASSES_OR_BLIND: YES
TRANSFERRING: 1-->ASSISTANCE (EQUIPMENT/PERSON) NEEDED (NOT DEVELOPMENTALLY APPROPRIATE)
CONCENTRATING,_REMEMBERING_OR_MAKING_DECISIONS_DIFFICULTY: NO
DRESS: 1-->ASSISTANCE (EQUIPMENT/PERSON) NEEDED (NOT DEVELOPMENTALLY APPROPRIATE)
TRANSFERRING: 1-->ASSISTANCE (EQUIPMENT/PERSON) NEEDED
FALL_HISTORY_WITHIN_LAST_SIX_MONTHS: NO
DRESSING/BATHING_DIFFICULTY: NO
CHANGE_IN_FUNCTIONAL_STATUS_SINCE_ONSET_OF_CURRENT_ILLNESS/INJURY: YES
TOILETING: 1-->ASSISTANCE (EQUIPMENT/PERSON) NEEDED

## 2022-07-17 NOTE — CONSULTS
Ortho Consult Note:    Called for patient who had surgery by Dr Temple for TKA last Thursday with Subsequent posterior dislocation yesterday.  She went to [facility] ER  to get a reduction and posterior splint.  Remains unstable in the splint so transferred to [facility] for further eval and treatment.  I did speak with Dr Temple yesterday and he is planning revision surgery this week at [facility].    Hx/Exam today:  Patient comfortable no pain unless she moves.  NVI  Foot warm can wiggle toes and move ankle.  Dressing dry, splint intact.  Adjusted pillows.    A/P  Unstable right TKA.  Will coordinate with Dr Temple surgical treament time.  He will need to order in revision equipment and schedule surgey time/date.  I explained the plan to the patient and she is understanding and agrees with the plan.    Kang Barrientos M.D.

## 2022-07-17 NOTE — PLAN OF CARE
PRIMARY DIAGNOSIS: ACUTE PAIN  OUTPATIENT/OBSERVATION GOALS TO BE MET BEFORE DISCHARGE:  1. Pain Status: Improved but still requiring IV narcotics.    2. Return to near baseline physical activity: No    3. Cleared for discharge by consultants (if involved): No    Discharge Planner Nurse   Safe discharge environment identified: No. Discharge plan TBA. Awaiting orthopedic consult.   Barriers to discharge: No.       Entered by: Alyson Preciado RN 07/16/2022 11:37 PM     Please review provider order for any additional goals.   Nurse to notify provider when observation goals have been met and patient is ready for discharge.Goal Outcome Evaluation:

## 2022-07-17 NOTE — PHARMACY-ADMISSION MEDICATION HISTORY
Pharmacy Note - Admission Medication History    Pertinent Provider Information:  NONE     ______________________________________________________________________    Prior To Admission (PTA) med list completed and updated in EMR.       PTA Med List   Medication Sig Last Dose     acetaminophen (TYLENOL) 500 MG tablet Take 1,000 mg by mouth every 6 hours as needed for mild pain PRN     aspirin (ASA) 81 MG chewable tablet Take 81 mg by mouth daily 7/16/2022 at Unknown time     ibuprofen (ADVIL/MOTRIN) 800 MG tablet Take 800 mg by mouth every 8 hours as needed for moderate pain 7/16/2022 at Unknown time     levothyroxine (SYNTHROID) 112 MCG tablet Take 1 tablet by mouth daily. 7/16/2022 at Unknown time     omeprazole 20 MG tablet Take 20 mg by mouth daily 7/16/2022 at Unknown time     oxyCODONE (ROXICODONE) 5 MG tablet Take 5-10 mg by mouth every 4 hours as needed for severe pain 7/16/2022 at Unknown time     polyethylene glycol (MIRALAX) 17 g packet Take 1 packet by mouth daily Past Week at Unknown time       Information source(s): Patient and CareEverywhere/SureScripts  Method of interview communication: phone    Summary of Changes to PTA Med List  New: oxycodone , ibuprofen  Discontinued:  None  Changed: NA    Patient was asked about OTC/herbal products specifically.  PTA med list reflects this.    In the past week, patient estimated taking medication this percent of the time:  Unable to assess    Allergies were reviewed, assessed, and updated with the patient.      Patient does not use any multi-dose medications prior to admission.    The information provided in this note is only as accurate as the sources available at the time of the update(s).    Thank you for the opportunity to participate in the care of this patient.    Yanet Prakash RPH  7/16/2022 10:33 PM

## 2022-07-17 NOTE — PROGRESS NOTES
"[facility] Orthopedic Pre-Op Consult Note         Assessment and Plan:    Assessment:   Post-operative day #2  Total knee arthoplasty, with subsequent dislocation     Plan for revision TKA tomorrow (currently scheduled for 9:30 AM)       Plan:    NPO after Midnight and Non weight bearing  Deep venous thrombosis prophylaxis - Lovenox this morning now holding  Pain control measures  Discharge plan: Home when passes PT post operatively  Risks and benefits reviewed for surgery tomorrow.  Additional problems to be followed by medicine physician           Interval History:   Patient had elective primary TKA at [facility] outpatient program on 7/14.  Subsequently ruptured PCL and dislocated. and was revised on 7/15 to dished polyethylene insert.  Mobilized WBAT in an immobilizer on 7/15 with PT and again with nursing on 7/16 and was discharged home on 7/16.  Then, later in the day on 7/16, she felt \"something give\" in her knee on 7/16.  She presented to [facility] and was attempted to try closed reduction of the knee, which unfortunately failed to stay reduced in a long leg splint.  She was admitted to [facility] for urgent revision TKA under my care.              Significant Problems:   (Refer to attending physician notes regarding additional medical problems and issues)          Review of Systems:   The patient denies any chest pain, shortness of breath, excessive pain, fever, chills, purulent drainage from the wound, nausea or vomiting.          Medications:     Current Facility-Administered Medications   Medication     acetaminophen (TYLENOL) tablet 650 mg    Or     acetaminophen (TYLENOL) Suppository 650 mg     bisacodyl (DULCOLAX) suppository 10 mg     hydrALAZINE (APRESOLINE) tablet 25 mg     HYDROcodone-acetaminophen (NORCO) 5-325 MG per tablet 1-2 tablet     HYDROmorphone (PF) (DILAUDID) injection 0.3 mg     hydrOXYzine (ATARAX) tablet 10 mg     levothyroxine (SYNTHROID/LEVOTHROID) tablet 112 mcg     " melatonin tablet 1 mg     naloxone (NARCAN) injection 0.2 mg    Or     naloxone (NARCAN) injection 0.4 mg    Or     naloxone (NARCAN) injection 0.2 mg    Or     naloxone (NARCAN) injection 0.4 mg     ondansetron (ZOFRAN ODT) ODT tab 4 mg    Or     ondansetron (ZOFRAN) injection 4 mg     pantoprazole (PROTONIX) EC tablet 40 mg     polyethylene glycol (MIRALAX) Packet 17 g     senna-docusate (SENOKOT-S/PERICOLACE) 8.6-50 MG per tablet 1 tablet             Physical Exam:   Blood pressure (!) 166/83, pulse 73, temperature 98.4  F (36.9  C), temperature source Oral, resp. rate 14, last menstrual period 03/21/2007, SpO2 96 %.      Right lower extremity    Incision:  dressing in place, clean, dry and intact    Lower Extremity Motor :   intact  Lower Extremity Sensory:  intact    Pulses:  2    Abnormal Exam findings:  Obvious deformity with posterior subluxation of tibia on femur.          Data:     Hemoglobin   Date Value Ref Range Status   07/17/2022 10.8 (L) 11.7 - 15.7 g/dL Final   07/16/2022 10.6 (L) 11.7 - 15.7 g/dL Final   01/15/2008 9.9 (L) 11.7 - 15.7 g/dL Final   01/11/2008 11.4 (L) 11.7 - 15.7 g/dL Final   10/04/2007 12.9 11.7 - 15.7 g/dL Final     No results found for: INR          Mckay Temple MD

## 2022-07-17 NOTE — PLAN OF CARE
Acute Traumatic Pain     1.  Pain controlled with oral analgesia: Yes      2.  Vital signs stable: No, hypertensive      3.  Diagnotic testing complete: No      4.  Cleared from consultants (if applicable): No, will need surgical intervention      5. Return to near baseline physical activity: No, bedrest  Goal Outcome Evaluation:    Plan of Care Reviewed With: patient     Overall Patient Progress: improving    Outcome Evaluation: pt will have tolerable pain control. surgical intervention pending.

## 2022-07-17 NOTE — H&P
[facility] MEDICINE ADMISSION HISTORY AND PHYSICAL       Assessment & Plan      1. Right knee dislocation, had reduction and posterior splint placement before transfer. Patient had a recent right TKA     XR -- Postoperative changes of total knee arthroplasty and patellar resurfacing. Interval reduction of the dislocation seen on the previous examination.     - IVF, NPO, pain meds, anti emetics  - [facility] referral  - AM labs     2. BMI of 37, HÉCTOR suspect    3. History of Elevated BP without diagnosis of hypertension and has HL    4. Hypothyroidism - PTA meds    7AM - Knee pain not better with pain meds, will get XR of the knee - check for dislocation    730A - XR - Acute posterior dislocation of the tibia under the femoral condyles.  RN to inform [facility]    VTE prophylaxis: SCDs - Lovenox not ordered for potential need for surgery.   Diet:  NPO for now, given potential surgery or procedure  Code Status: Full,  COVID test result:  negative   COVID vaccination: completed   Barriers to discharge: admitting clinical condition  Discharge Disposition and goals:  Unable to determine at this point, pending clinical progress and response to treatment. Patient may need transfer to SNF or ACR if unsafe to go home and needed treatment inappropriate at home setting OR may need home health care evaluation if care can be delivered at home settings. Consider referral to care manager/    PPE - I was wearing PPE when I met the patient - N95 mask, Surgical mask, Isolation gown, Gloves, Safety glasses.      Care plan was created based on available information provided, including patient's condition at the time of encounter.   This plan was discussed with patient and/or family members using layman's terms and have agreed to proceed.   At the end of night shift (9PM - 730A), this case will be presented to the AM Hospitalist.    It is recommended to revise care plan and review history if there is change in  condition and/or new clinical information is not available during my encounter.     All or some of home medication/s were not resumed on admission due to safety reasons or contraindications. Dosing and frequency may also have been modified. Please resume/review them during your visit.     70 minutes of total visit duration and greater than 50% was spent in direct evaluation of patient and coordination of care including discussion of diagnostic test results and recommended treatment. .      Curtis العراقي MD, MPH, FACP, [facility]  Internal Medicine - Hospitalist        Chief Complaint Knee pain       HISTORY     - Transfer from [facility]  - Recent R knee arthroplasty and now had dislocation. Prior to transfer - reduction and posterior splint was applied.  - When I met her, she was awake and alert, looked comfortable at rest. And denies complaints of CP or SOB. No abdominal pain or diarrhea.  - Today. she was at home, seated on the couch and she was trying to reach her right knee to fix something and felt something slide out. She had pain and was concerned of dislocation.  She decided to come to ED.     - ROS --- No headache. No dizziness. No weakness. No CP or SOB. No palpitations. No abdominal pain. No nausea or vomiting. No urinary symptoms. No bleeding symptoms. No weight loss. Rest of 12 point ROS was reviewed and negative.       Past Medical History     Past Medical History:   Diagnosis Date     Leiomyoma of uterus, unspecified      Unspecified hypothyroidism          Surgical History     Past Surgical History:   Procedure Laterality Date     C/SECTION, LOW TRANSVERSE       D & C       HYSTERECTOMY, PAP NO LONGER INDICATED  2008        Family History      Family History   Problem Relation Age of Onset     Diabetes Sister         Adult onset     Heart Disease Maternal Grandfather          of heart attack     C.A.D. Maternal Grandfather      Breast Cancer Maternal Aunt         Dx in her 50's      Diabetes Father         Adult onset     Eye Disorder Father         some type of retina issue     Thyroid Disease Father      Cancer Paternal Uncle         bladder     Breast Cancer Mother      Diabetes Paternal Uncle          Social History      .  Social History     Socioeconomic History     Marital status:      Spouse name: Not on file     Number of children: 1     Years of education: Not on file     Highest education level: Not on file   Occupational History     Occupation: RN     Employer: [employer]   Tobacco Use     Smoking status: Never Smoker     Smokeless tobacco: Not on file   Substance and Sexual Activity     Alcohol use: No     Drug use: No     Sexual activity: Yes     Partners: Male     Birth control/protection: Surgical     Comment: hysterectomy   Other Topics Concern     Parent/sibling w/ CABG, MI or angioplasty before 65F 55M? Yes     Comment: dad had angioplasty at age 61 yrs   Social History Narrative     Not on file     Social Determinants of Health     Financial Resource Strain: Not on file   Food Insecurity: Not on file   Transportation Needs: Not on file   Physical Activity: Not on file   Stress: Not on file   Social Connections: Not on file   Intimate Partner Violence: Not on file   Housing Stability: Not on file          Allergies      No Known Allergies      Prior to Admission Medications      [COMPLETED] HYDROmorphone (PF) (DILAUDID) injection 0.5 mg  ondansetron (ZOFRAN) injection 4 mg    acetaminophen (TYLENOL) 500 MG tablet, Take 1,000 mg by mouth every 6 hours as needed for mild pain  aspirin (ASA) 81 MG chewable tablet, Take 81 mg by mouth daily  ibuprofen (ADVIL/MOTRIN) 800 MG tablet, Take 800 mg by mouth every 8 hours as needed for moderate pain  levothyroxine (SYNTHROID) 112 MCG tablet, Take 1 tablet by mouth daily.  omeprazole 20 MG tablet, Take 20 mg by mouth daily  oxyCODONE IR (ROXICODONE) 10 MG tablet, Take 5-10 mg by mouth every 4 hours as needed  for severe pain  polyethylene glycol (MIRALAX) 17 g packet, Take 1 packet by mouth daily            Review of Systems     A 12 point comprehensive review of systems was negative except as noted above in HPI.    PHYSICAL EXAMINATION       Vitals      Vitals: LMP 03/21/2007   BMI= There is no height or weight on file to calculate BMI.      Examination     General Appearance:  Alert, cooperative, no distress  Head:    Normocephalic, without obvious abnormality, atraumatic  EENT:  PERRL, conjunctiva/corneas clear, EOM's intact.   Neck:   Supple, symmetrical, trachea midline, no adenopathy; no NVE  Back:  Symmetric, no curvature, no CVA tenderness  Chest/Lungs: Clear to auscultation bilaterally, respirations unlabored, No tenderness or deformity. No abdominal breathing or use of accessory muscles.   Heart:    Regular rate and rhythm, S1 and S2 normal, no murmur, rub   or gallop  Abdomen: Soft, non-tender, bowel sounds active all four quadrants, not peritoneal on palpation. Not distended  Extremities:  Extremities normal, atraumatic, no swelling. Unable to examine right knee, no cyanosis   Skin:  Skin color, texture, turgor normal, no rashes or lesion  Neurologic:  Awake and alert, No lateralizing or localizing signs           Pertinent Lab              Pertinent Radiology

## 2022-07-17 NOTE — PLAN OF CARE
Acute Traumatic Pain     1.  Pain controlled with oral analgesia: Yes      2.  Vital signs stable: No, pt hypertensive.      3.  Diagnotic testing complete: Yes      4.  Cleared from consultants (if applicable): No, surgical intervention planned.      5. Return to near baseline physical activity: No, pt is bedrest at this time due to unstable dislocation.  Goal Outcome Evaluation:    Plan of Care Reviewed With: patient     Overall Patient Progress: improving    Outcome Evaluation: pt will have tolerable pain control. surgical intervention pending.

## 2022-07-17 NOTE — PROGRESS NOTES
PRIMARY DIAGNOSIS: ACUTE PAIN  OUTPATIENT/OBSERVATION GOALS TO BE MET BEFORE DISCHARGE:  1. Pain Status: Improved-controlled with oral pain medications.    2. Return to near baseline physical activity: No    3. Cleared for discharge by consultants (if involved): No    Discharge Planner Nurse   Safe discharge environment identified: No.  Plan TBD, ortho consult this am.  Barriers to discharge: No       Entered by: Anabelle Simental RN 07/17/2022 3:48 AM     Please review provider order for any additional goals.   Nurse to notify provider when observation goals have been met and patient is ready for discharge.

## 2022-07-17 NOTE — PROGRESS NOTES
PRIMARY DIAGNOSIS: ACUTE PAIN  OUTPATIENT/OBSERVATION GOALS TO BE MET BEFORE DISCHARGE:  1. Pain Status: No improvement noted. Consider adjustment in pain regimen.  Spoke with hospitalist and obtained order for increase in dose of Norco as well as portable knee x-ray.    2. Return to near baseline physical activity: No, pt is on bedrest    3. Cleared for discharge by consultants (if involved): No    Discharge Planner Nurse   Safe discharge environment identified: No, awaiting ortho consultation for this am.     Barriers to discharge: No       Entered by: Anabelle Simentla RN 07/17/2022 7:45 AM     Please review provider order for any additional goals.   Nurse to notify provider when observation goals have been met and patient is ready for discharge.    Update: 0640  X-ray shows dislocation of knee.  Call to [facility] on-call MD for further orders for pain control and treatment plan.  Awaiting call back.

## 2022-07-17 NOTE — PROGRESS NOTES
[facility] MEDICINE PROGRESS NOTE      Identification/Summary: Kristen Graff is a 57 year old female with a past medical history of hypothyroidism, GERD who was admitted on 7/16/2022 for dislocated right knee 3 days post op TKR.  . Hospital course is notable for pain control and plans for revision with Dr Temple tomorrow Monday 7/18  Labile bp noted without hx of HTN. Negative pre-op Echo    Assessment and Plan:    Principal Problem:    Dislocation closed, knee (7/16/2022)  Active Problems:    Hypothyroidism (12/14/2005)    Esophageal reflux (4/6/2006)    Status post total hip replacement, right (7/17/2022)    Primary osteoarthritis of both knees (5/2/2016)    H/O echocardiogram (7/17/2022)    Elevated blood pressure reading without diagnosis of hypertension (7/17/2022)        Diet: Regular Diet Adult  NPO per Anesthesia Guidelines for Procedure/Surgery Except for: Meds  DVT Prophylaxis:  Enoxaparin (Lovenox) subcutaneous x 1 pre-op then reassess  Code Status: Full Code    Anticipated possible discharge in  days to  once  milestones are met.  Disposition Plan      Expected Discharge Date: 07/18/2022                The patient's care was discussed with the Bedside Nurse.; Dr Momo Barrientos- ANNA Galindo MD  [facility]  [facility]  Phone: #[phone]    Interval History/Subjective:      Physical Exam/Objective:  Temp:  [98.4  F (36.9  C)-98.8  F (37.1  C)] 98.4  F (36.9  C)  Pulse:  [66-76] 73  Resp:  [10-14] 14  BP: (144-171)/(74-90) 166/83  SpO2:  [96 %-100 %] 96 %  There is no height or weight on file to calculate BMI.    Comfortable knee in wrap with pillow support  Brushing teeth  Cor reg  Lungs clear    viewed all new medications, labs, imaging/diagnostics reports over the past 24 hours. Pertinent findings include:    Imaging:   Recent Results (from the past 24 hour(s))   XR Knee Right 1/2 Views    Narrative    EXAM: XR KNEE RT 1  /2 VW  LOCATION: [facility]  DATE/TIME: 7/16/2022 5:13 PM    INDICATION: Recent arthroplasty, now with concern for recurrent dislocation  COMPARISON: None.      Impression    IMPRESSION: Postoperative changes of total knee arthroplasty. Patellar resurfacing. Posterior dislocation at the knee joint. Post procedural air within the joint.   XR Knee Right 1/2 Views    Narrative    EXAM: XR KNEE RT 1 /2 VW  LOCATION: [facility]  DATE/TIME: 7/16/2022 7:17 PM    INDICATION: Close reduction.  COMPARISON: 7/16/2022.      Impression    IMPRESSION: Postoperative changes of total knee arthroplasty and patellar resurfacing. Interval reduction of the dislocation seen on the previous examination. Post procedural air within the joint and surrounding soft tissues.   XR Knee Port Right 1/2 Views    Narrative    EXAM: XR KNEE PORT RIGHT 1/2 VIEWS  LOCATION: [facility]  DATE/TIME: 7/17/2022 6:09 AM    INDICATION: Postoperative.  COMPARISON: 04/21/2022.      Impression    IMPRESSION: Cemented right total knee arthroplasty. Acute posterior dislocation of the tibia under the femoral condyles. No fracture. Components appear well seated without evidence of loosening. Skin staples in place.       Labs:  Recent Results (from the past 24 hour(s))   Asymptomatic COVID-19 Virus (Coronavirus) by PCR Nasopharyngeal    Collection Time: 07/16/22  7:12 PM    Specimen: Nasopharyngeal; Swab   Result Value Ref Range    SARS CoV2 PCR Negative Negative   Comprehensive metabolic panel    Collection Time: 07/16/22 10:07 PM   Result Value Ref Range    Sodium 141 136 - 145 mmol/L    Potassium 4.2 3.5 - 5.0 mmol/L    Chloride 105 98 - 107 mmol/L    Carbon Dioxide (CO2) 28 22 - 31 mmol/L    Anion Gap 8 5 - 18 mmol/L    Urea Nitrogen 13 8 - 22 mg/dL    Creatinine 0.78 0.60 - 1.10 mg/dL    Calcium 8.4 (L) 8.5 - 10.5 mg/dL    Glucose 93 70 - 125 mg/dL    Alkaline Phosphatase 53 45 -  120 U/L    AST 28 0 - 40 U/L    ALT 15 0 - 45 U/L    Protein Total 6.4 6.0 - 8.0 g/dL    Albumin 3.4 (L) 3.5 - 5.0 g/dL    Bilirubin Total 0.9 0.0 - 1.0 mg/dL    GFR Estimate 89 >60 mL/min/1.73m2   Magnesium    Collection Time: 07/16/22 10:07 PM   Result Value Ref Range    Magnesium 1.7 (L) 1.8 - 2.6 mg/dL   CBC with platelets and differential    Collection Time: 07/16/22 10:07 PM   Result Value Ref Range    WBC Count 10.0 4.0 - 11.0 10e3/uL    RBC Count 3.63 (L) 3.80 - 5.20 10e6/uL    Hemoglobin 10.6 (L) 11.7 - 15.7 g/dL    Hematocrit 32.4 (L) 35.0 - 47.0 %    MCV 89 78 - 100 fL    MCH 29.2 26.5 - 33.0 pg    MCHC 32.7 31.5 - 36.5 g/dL    RDW 12.9 10.0 - 15.0 %    Platelet Count 166 150 - 450 10e3/uL    % Neutrophils 61 %    % Lymphocytes 33 %    % Monocytes 5 %    % Eosinophils 0 %    % Basophils 0 %    % Immature Granulocytes 1 %    NRBCs per 100 WBC 0 <1 /100    Absolute Neutrophils 6.0 1.6 - 8.3 10e3/uL    Absolute Lymphocytes 3.3 0.8 - 5.3 10e3/uL    Absolute Monocytes 0.5 0.0 - 1.3 10e3/uL    Absolute Eosinophils 0.0 0.0 - 0.7 10e3/uL    Absolute Basophils 0.0 0.0 - 0.2 10e3/uL    Absolute Immature Granulocytes 0.1 <=0.4 10e3/uL    Absolute NRBCs 0.0 10e3/uL   Comprehensive metabolic panel    Collection Time: 07/17/22  7:29 AM   Result Value Ref Range    Sodium 138 136 - 145 mmol/L    Potassium 3.8 3.5 - 5.0 mmol/L    Chloride 101 98 - 107 mmol/L    Carbon Dioxide (CO2) 28 22 - 31 mmol/L    Anion Gap 9 5 - 18 mmol/L    Urea Nitrogen 10 8 - 22 mg/dL    Creatinine 0.77 0.60 - 1.10 mg/dL    Calcium 8.7 8.5 - 10.5 mg/dL    Glucose 98 70 - 125 mg/dL    Alkaline Phosphatase 56 45 - 120 U/L    AST 28 0 - 40 U/L    ALT 15 0 - 45 U/L    Protein Total 6.7 6.0 - 8.0 g/dL    Albumin 3.4 (L) 3.5 - 5.0 g/dL    Bilirubin Total 1.5 (H) 0.0 - 1.0 mg/dL    GFR Estimate >90 >60 mL/min/1.73m2   Magnesium Lab    Collection Time: 07/17/22  7:29 AM   Result Value Ref Range    Magnesium 1.7 (L) 1.8 - 2.6 mg/dL   CBC with platelets  and differential    Collection Time: 07/17/22  7:29 AM   Result Value Ref Range    WBC Count 9.4 4.0 - 11.0 10e3/uL    RBC Count 3.75 (L) 3.80 - 5.20 10e6/uL    Hemoglobin 10.8 (L) 11.7 - 15.7 g/dL    Hematocrit 33.3 (L) 35.0 - 47.0 %    MCV 89 78 - 100 fL    MCH 28.8 26.5 - 33.0 pg    MCHC 32.4 31.5 - 36.5 g/dL    RDW 13.0 10.0 - 15.0 %    Platelet Count 163 150 - 450 10e3/uL    % Neutrophils 69 %    % Lymphocytes 23 %    % Monocytes 6 %    % Eosinophils 1 %    % Basophils 0 %    % Immature Granulocytes 1 %    NRBCs per 100 WBC 0 <1 /100    Absolute Neutrophils 6.5 1.6 - 8.3 10e3/uL    Absolute Lymphocytes 2.2 0.8 - 5.3 10e3/uL    Absolute Monocytes 0.6 0.0 - 1.3 10e3/uL    Absolute Eosinophils 0.1 0.0 - 0.7 10e3/uL    Absolute Basophils 0.0 0.0 - 0.2 10e3/uL    Absolute Immature Granulocytes 0.1 <=0.4 10e3/uL    Absolute NRBCs 0.0 10e3/uL         Medications:   Personally Reviewed.  Medications       levothyroxine  112 mcg Oral Daily     pantoprazole  40 mg Oral QAM AC     polyethylene glycol  17 g Oral Daily

## 2022-07-18 ENCOUNTER — APPOINTMENT (OUTPATIENT)
Dept: RADIOLOGY | Facility: CLINIC | Age: 57
DRG: 468 | End: 2022-07-18
Attending: ORTHOPAEDIC SURGERY
Payer: COMMERCIAL

## 2022-07-18 ENCOUNTER — ANESTHESIA EVENT (OUTPATIENT)
Dept: SURGERY | Facility: CLINIC | Age: 57
DRG: 468 | End: 2022-07-18
Payer: COMMERCIAL

## 2022-07-18 ENCOUNTER — ANESTHESIA (OUTPATIENT)
Dept: SURGERY | Facility: CLINIC | Age: 57
DRG: 468 | End: 2022-07-18
Payer: COMMERCIAL

## 2022-07-18 LAB
CREAT SERPL-MCNC: 0.74 MG/DL (ref 0.6–1.1)
GFR SERPL CREATININE-BSD FRML MDRD: >90 ML/MIN/1.73M2
HOLD SPECIMEN: NORMAL
MAGNESIUM SERPL-MCNC: 1.9 MG/DL (ref 1.8–2.6)
POTASSIUM BLD-SCNC: 3.9 MMOL/L (ref 3.5–5)

## 2022-07-18 PROCEDURE — 250N000013 HC RX MED GY IP 250 OP 250 PS 637: Performed by: INTERNAL MEDICINE

## 2022-07-18 PROCEDURE — 999N000141 HC STATISTIC PRE-PROCEDURE NURSING ASSESSMENT: Performed by: ORTHOPAEDIC SURGERY

## 2022-07-18 PROCEDURE — 250N000009 HC RX 250: Performed by: ANESTHESIOLOGY

## 2022-07-18 PROCEDURE — 258N000003 HC RX IP 258 OP 636: Performed by: ANESTHESIOLOGY

## 2022-07-18 PROCEDURE — 999N000182 XR SURGERY CARM FLUORO GREATER THAN 5 MIN

## 2022-07-18 PROCEDURE — 99232 SBSQ HOSP IP/OBS MODERATE 35: CPT | Performed by: INTERNAL MEDICINE

## 2022-07-18 PROCEDURE — 250N000011 HC RX IP 250 OP 636: Performed by: ANESTHESIOLOGY

## 2022-07-18 PROCEDURE — 999N000065 XR KNEE PORT RIGHT 1/2 VIEWS: Mod: RT

## 2022-07-18 PROCEDURE — 250N000011 HC RX IP 250 OP 636: Performed by: ORTHOPAEDIC SURGERY

## 2022-07-18 PROCEDURE — 258N000001 HC RX 258: Performed by: ORTHOPAEDIC SURGERY

## 2022-07-18 PROCEDURE — 272N000001 HC OR GENERAL SUPPLY STERILE: Performed by: ORTHOPAEDIC SURGERY

## 2022-07-18 PROCEDURE — 0SUC09Z SUPPLEMENT RIGHT KNEE JOINT WITH LINER, OPEN APPROACH: ICD-10-PCS | Performed by: ORTHOPAEDIC SURGERY

## 2022-07-18 PROCEDURE — 250N000009 HC RX 250: Performed by: ORTHOPAEDIC SURGERY

## 2022-07-18 PROCEDURE — 0SPC09Z REMOVAL OF LINER FROM RIGHT KNEE JOINT, OPEN APPROACH: ICD-10-PCS | Performed by: ORTHOPAEDIC SURGERY

## 2022-07-18 PROCEDURE — C1776 JOINT DEVICE (IMPLANTABLE): HCPCS | Performed by: ORTHOPAEDIC SURGERY

## 2022-07-18 PROCEDURE — 0SPT0JZ REMOVAL OF SYNTHETIC SUBSTITUTE FROM RIGHT KNEE JOINT, FEMORAL SURFACE, OPEN APPROACH: ICD-10-PCS | Performed by: ORTHOPAEDIC SURGERY

## 2022-07-18 PROCEDURE — 120N000001 HC R&B MED SURG/OB

## 2022-07-18 PROCEDURE — C1713 ANCHOR/SCREW BN/BN,TIS/BN: HCPCS | Performed by: ORTHOPAEDIC SURGERY

## 2022-07-18 PROCEDURE — 82565 ASSAY OF CREATININE: CPT | Performed by: ORTHOPAEDIC SURGERY

## 2022-07-18 PROCEDURE — 250N000013 HC RX MED GY IP 250 OP 250 PS 637: Performed by: ORTHOPAEDIC SURGERY

## 2022-07-18 PROCEDURE — 36415 COLL VENOUS BLD VENIPUNCTURE: CPT | Performed by: INTERNAL MEDICINE

## 2022-07-18 PROCEDURE — 710N000010 HC RECOVERY PHASE 1, LEVEL 2, PER MIN: Performed by: ORTHOPAEDIC SURGERY

## 2022-07-18 PROCEDURE — 83735 ASSAY OF MAGNESIUM: CPT | Performed by: INTERNAL MEDICINE

## 2022-07-18 PROCEDURE — 36415 COLL VENOUS BLD VENIPUNCTURE: CPT | Performed by: ORTHOPAEDIC SURGERY

## 2022-07-18 PROCEDURE — 360N000078 HC SURGERY LEVEL 5, PER MIN: Performed by: ORTHOPAEDIC SURGERY

## 2022-07-18 PROCEDURE — 0SRT0J9 REPLACEMENT OF RIGHT KNEE JOINT, FEMORAL SURFACE WITH SYNTHETIC SUBSTITUTE, CEMENTED, OPEN APPROACH: ICD-10-PCS | Performed by: ORTHOPAEDIC SURGERY

## 2022-07-18 PROCEDURE — 370N000017 HC ANESTHESIA TECHNICAL FEE, PER MIN: Performed by: ORTHOPAEDIC SURGERY

## 2022-07-18 PROCEDURE — 84132 ASSAY OF SERUM POTASSIUM: CPT | Performed by: INTERNAL MEDICINE

## 2022-07-18 DEVICE — IMPLANTABLE DEVICE: Type: IMPLANTABLE DEVICE | Site: KNEE | Status: FUNCTIONAL

## 2022-07-18 DEVICE — IMP BONE CEMENT SIMPLEX W/GENTAMICIN 6195-1-001: Type: IMPLANTABLE DEVICE | Site: KNEE | Status: FUNCTIONAL

## 2022-07-18 DEVICE — LEGION OXIN CONSTRAINED FEMORAL SZ                                    3 RT
Type: IMPLANTABLE DEVICE | Site: KNEE | Status: FUNCTIONAL
Brand: LEGION

## 2022-07-18 DEVICE — LEGION OFFSET COUPLER 2MM
Type: IMPLANTABLE DEVICE | Site: KNEE | Status: FUNCTIONAL
Brand: LEGION

## 2022-07-18 RX ORDER — OXYCODONE HYDROCHLORIDE 5 MG/1
5 TABLET ORAL EVERY 4 HOURS PRN
Status: DISCONTINUED | OUTPATIENT
Start: 2022-07-18 | End: 2022-07-18 | Stop reason: HOSPADM

## 2022-07-18 RX ORDER — FENTANYL CITRATE 50 UG/ML
50 INJECTION, SOLUTION INTRAMUSCULAR; INTRAVENOUS
Status: DISCONTINUED | OUTPATIENT
Start: 2022-07-18 | End: 2022-07-18 | Stop reason: HOSPADM

## 2022-07-18 RX ORDER — HYDROXYZINE HYDROCHLORIDE 25 MG/1
25 TABLET, FILM COATED ORAL EVERY 6 HOURS PRN
Status: DISCONTINUED | OUTPATIENT
Start: 2022-07-18 | End: 2022-07-20 | Stop reason: HOSPADM

## 2022-07-18 RX ORDER — AMOXICILLIN 250 MG
1 CAPSULE ORAL 2 TIMES DAILY
Status: DISCONTINUED | OUTPATIENT
Start: 2022-07-18 | End: 2022-07-20 | Stop reason: HOSPADM

## 2022-07-18 RX ORDER — LIDOCAINE 40 MG/G
CREAM TOPICAL
Status: DISCONTINUED | OUTPATIENT
Start: 2022-07-18 | End: 2022-07-18 | Stop reason: HOSPADM

## 2022-07-18 RX ORDER — MAGNESIUM SULFATE 4 G/50ML
4 INJECTION INTRAVENOUS ONCE
Status: COMPLETED | OUTPATIENT
Start: 2022-07-18 | End: 2022-07-18

## 2022-07-18 RX ORDER — SODIUM CHLORIDE, SODIUM LACTATE, POTASSIUM CHLORIDE, CALCIUM CHLORIDE 600; 310; 30; 20 MG/100ML; MG/100ML; MG/100ML; MG/100ML
INJECTION, SOLUTION INTRAVENOUS CONTINUOUS
Status: DISCONTINUED | OUTPATIENT
Start: 2022-07-18 | End: 2022-07-18 | Stop reason: HOSPADM

## 2022-07-18 RX ORDER — ONDANSETRON 4 MG/1
4 TABLET, ORALLY DISINTEGRATING ORAL EVERY 30 MIN PRN
Status: DISCONTINUED | OUTPATIENT
Start: 2022-07-18 | End: 2022-07-18 | Stop reason: HOSPADM

## 2022-07-18 RX ORDER — PROPOFOL 10 MG/ML
INJECTION, EMULSION INTRAVENOUS CONTINUOUS PRN
Status: DISCONTINUED | OUTPATIENT
Start: 2022-07-18 | End: 2022-07-18

## 2022-07-18 RX ORDER — ONDANSETRON 2 MG/ML
4 INJECTION INTRAMUSCULAR; INTRAVENOUS EVERY 6 HOURS PRN
Status: DISCONTINUED | OUTPATIENT
Start: 2022-07-18 | End: 2022-07-20 | Stop reason: HOSPADM

## 2022-07-18 RX ORDER — ACETAMINOPHEN 325 MG/1
975 TABLET ORAL EVERY 8 HOURS
Status: DISCONTINUED | OUTPATIENT
Start: 2022-07-18 | End: 2022-07-20 | Stop reason: HOSPADM

## 2022-07-18 RX ORDER — BISACODYL 10 MG
10 SUPPOSITORY, RECTAL RECTAL DAILY PRN
Status: DISCONTINUED | OUTPATIENT
Start: 2022-07-18 | End: 2022-07-20 | Stop reason: HOSPADM

## 2022-07-18 RX ORDER — ATROPINE SULFATE 0.4 MG/ML
AMPUL (ML) INJECTION PRN
Status: DISCONTINUED | OUTPATIENT
Start: 2022-07-18 | End: 2022-07-18

## 2022-07-18 RX ORDER — OXYCODONE HYDROCHLORIDE 5 MG/1
5 TABLET ORAL EVERY 4 HOURS PRN
Status: DISCONTINUED | OUTPATIENT
Start: 2022-07-18 | End: 2022-07-20 | Stop reason: HOSPADM

## 2022-07-18 RX ORDER — CEFAZOLIN SODIUM/WATER 2 G/20 ML
2 SYRINGE (ML) INTRAVENOUS SEE ADMIN INSTRUCTIONS
Status: DISCONTINUED | OUTPATIENT
Start: 2022-07-18 | End: 2022-07-18 | Stop reason: HOSPADM

## 2022-07-18 RX ORDER — CEFAZOLIN SODIUM/WATER 2 G/20 ML
2 SYRINGE (ML) INTRAVENOUS
Status: COMPLETED | OUTPATIENT
Start: 2022-07-18 | End: 2022-07-18

## 2022-07-18 RX ORDER — ROPIVACAINE HYDROCHLORIDE 5 MG/ML
INJECTION, SOLUTION EPIDURAL; INFILTRATION; PERINEURAL
Status: COMPLETED | OUTPATIENT
Start: 2022-07-18 | End: 2022-07-18

## 2022-07-18 RX ORDER — ACETAMINOPHEN 325 MG/1
650 TABLET ORAL EVERY 4 HOURS PRN
Status: DISCONTINUED | OUTPATIENT
Start: 2022-07-21 | End: 2022-07-20 | Stop reason: HOSPADM

## 2022-07-18 RX ORDER — HYDROMORPHONE HCL IN WATER/PF 6 MG/30 ML
0.2 PATIENT CONTROLLED ANALGESIA SYRINGE INTRAVENOUS
Status: DISCONTINUED | OUTPATIENT
Start: 2022-07-18 | End: 2022-07-20 | Stop reason: HOSPADM

## 2022-07-18 RX ORDER — CEFAZOLIN SODIUM 2 G/100ML
2 INJECTION, SOLUTION INTRAVENOUS EVERY 8 HOURS
Status: COMPLETED | OUTPATIENT
Start: 2022-07-18 | End: 2022-07-19

## 2022-07-18 RX ORDER — ONDANSETRON 4 MG/1
4 TABLET, ORALLY DISINTEGRATING ORAL EVERY 6 HOURS PRN
Status: DISCONTINUED | OUTPATIENT
Start: 2022-07-18 | End: 2022-07-20 | Stop reason: HOSPADM

## 2022-07-18 RX ORDER — OXYCODONE HYDROCHLORIDE 5 MG/1
10 TABLET ORAL EVERY 4 HOURS PRN
Status: DISCONTINUED | OUTPATIENT
Start: 2022-07-18 | End: 2022-07-20 | Stop reason: HOSPADM

## 2022-07-18 RX ORDER — HYDROMORPHONE HCL IN WATER/PF 6 MG/30 ML
0.2 PATIENT CONTROLLED ANALGESIA SYRINGE INTRAVENOUS EVERY 5 MIN PRN
Status: DISCONTINUED | OUTPATIENT
Start: 2022-07-18 | End: 2022-07-18 | Stop reason: HOSPADM

## 2022-07-18 RX ORDER — POLYETHYLENE GLYCOL 3350 17 G/17G
17 POWDER, FOR SOLUTION ORAL DAILY
Status: DISCONTINUED | OUTPATIENT
Start: 2022-07-19 | End: 2022-07-20 | Stop reason: HOSPADM

## 2022-07-18 RX ORDER — ONDANSETRON 2 MG/ML
4 INJECTION INTRAMUSCULAR; INTRAVENOUS EVERY 30 MIN PRN
Status: DISCONTINUED | OUTPATIENT
Start: 2022-07-18 | End: 2022-07-18 | Stop reason: HOSPADM

## 2022-07-18 RX ORDER — ACETAMINOPHEN 325 MG/1
975 TABLET ORAL ONCE
Status: COMPLETED | OUTPATIENT
Start: 2022-07-18 | End: 2022-07-18

## 2022-07-18 RX ORDER — BUPIVACAINE HYDROCHLORIDE 7.5 MG/ML
INJECTION, SOLUTION INTRASPINAL
Status: COMPLETED | OUTPATIENT
Start: 2022-07-18 | End: 2022-07-18

## 2022-07-18 RX ORDER — MEPERIDINE HYDROCHLORIDE 25 MG/ML
12.5 INJECTION INTRAMUSCULAR; INTRAVENOUS; SUBCUTANEOUS
Status: DISCONTINUED | OUTPATIENT
Start: 2022-07-18 | End: 2022-07-18 | Stop reason: HOSPADM

## 2022-07-18 RX ORDER — FENTANYL CITRATE 50 UG/ML
50 INJECTION, SOLUTION INTRAMUSCULAR; INTRAVENOUS
Status: DISCONTINUED | OUTPATIENT
Start: 2022-07-18 | End: 2022-07-18

## 2022-07-18 RX ORDER — PROCHLORPERAZINE MALEATE 10 MG
10 TABLET ORAL EVERY 6 HOURS PRN
Status: DISCONTINUED | OUTPATIENT
Start: 2022-07-18 | End: 2022-07-20 | Stop reason: HOSPADM

## 2022-07-18 RX ORDER — LIDOCAINE 40 MG/G
CREAM TOPICAL
Status: DISCONTINUED | OUTPATIENT
Start: 2022-07-18 | End: 2022-07-20 | Stop reason: HOSPADM

## 2022-07-18 RX ORDER — KETOROLAC TROMETHAMINE 15 MG/ML
15 INJECTION, SOLUTION INTRAMUSCULAR; INTRAVENOUS EVERY 6 HOURS
Status: COMPLETED | OUTPATIENT
Start: 2022-07-18 | End: 2022-07-19

## 2022-07-18 RX ORDER — SODIUM CHLORIDE, SODIUM LACTATE, POTASSIUM CHLORIDE, CALCIUM CHLORIDE 600; 310; 30; 20 MG/100ML; MG/100ML; MG/100ML; MG/100ML
INJECTION, SOLUTION INTRAVENOUS CONTINUOUS
Status: DISCONTINUED | OUTPATIENT
Start: 2022-07-18 | End: 2022-07-20 | Stop reason: HOSPADM

## 2022-07-18 RX ORDER — TRANEXAMIC ACID 650 MG/1
1950 TABLET ORAL ONCE
Status: COMPLETED | OUTPATIENT
Start: 2022-07-18 | End: 2022-07-18

## 2022-07-18 RX ORDER — CELECOXIB 200 MG/1
400 CAPSULE ORAL ONCE
Status: COMPLETED | OUTPATIENT
Start: 2022-07-18 | End: 2022-07-18

## 2022-07-18 RX ORDER — FENTANYL CITRATE 50 UG/ML
25 INJECTION, SOLUTION INTRAMUSCULAR; INTRAVENOUS
Status: DISCONTINUED | OUTPATIENT
Start: 2022-07-18 | End: 2022-07-18 | Stop reason: HOSPADM

## 2022-07-18 RX ORDER — EPHEDRINE SULFATE 50 MG/ML
INJECTION, SOLUTION INTRAMUSCULAR; INTRAVENOUS; SUBCUTANEOUS PRN
Status: DISCONTINUED | OUTPATIENT
Start: 2022-07-18 | End: 2022-07-18

## 2022-07-18 RX ORDER — ENOXAPARIN SODIUM 100 MG/ML
40 INJECTION SUBCUTANEOUS EVERY 24 HOURS
Status: DISCONTINUED | OUTPATIENT
Start: 2022-07-19 | End: 2022-07-20 | Stop reason: HOSPADM

## 2022-07-18 RX ORDER — FENTANYL CITRATE 50 UG/ML
25 INJECTION, SOLUTION INTRAMUSCULAR; INTRAVENOUS EVERY 5 MIN PRN
Status: DISCONTINUED | OUTPATIENT
Start: 2022-07-18 | End: 2022-07-18 | Stop reason: HOSPADM

## 2022-07-18 RX ORDER — HYDROMORPHONE HCL IN WATER/PF 6 MG/30 ML
0.4 PATIENT CONTROLLED ANALGESIA SYRINGE INTRAVENOUS
Status: DISCONTINUED | OUTPATIENT
Start: 2022-07-18 | End: 2022-07-20 | Stop reason: HOSPADM

## 2022-07-18 RX ORDER — FENTANYL CITRATE 50 UG/ML
100 INJECTION, SOLUTION INTRAMUSCULAR; INTRAVENOUS
Status: DISCONTINUED | OUTPATIENT
Start: 2022-07-18 | End: 2022-07-18

## 2022-07-18 RX ADMIN — CEFAZOLIN SODIUM 2 G: 2 INJECTION, SOLUTION INTRAVENOUS at 18:27

## 2022-07-18 RX ADMIN — ACETAMINOPHEN 975 MG: 325 TABLET ORAL at 18:27

## 2022-07-18 RX ADMIN — SODIUM CHLORIDE, POTASSIUM CHLORIDE, SODIUM LACTATE AND CALCIUM CHLORIDE: 600; 310; 30; 20 INJECTION, SOLUTION INTRAVENOUS at 09:26

## 2022-07-18 RX ADMIN — TRANEXAMIC ACID 1950 MG: 650 TABLET ORAL at 09:26

## 2022-07-18 RX ADMIN — FENTANYL CITRATE 25 MCG: 50 INJECTION, SOLUTION INTRAMUSCULAR; INTRAVENOUS at 13:05

## 2022-07-18 RX ADMIN — BUPIVACAINE HYDROCHLORIDE IN DEXTROSE 1.6 ML: 7.5 INJECTION, SOLUTION SUBARACHNOID at 09:56

## 2022-07-18 RX ADMIN — LEVOTHYROXINE SODIUM 112 MCG: 0.11 TABLET ORAL at 07:51

## 2022-07-18 RX ADMIN — OXYCODONE HYDROCHLORIDE 5 MG: 5 TABLET ORAL at 13:59

## 2022-07-18 RX ADMIN — PANTOPRAZOLE SODIUM 40 MG: 20 TABLET, DELAYED RELEASE ORAL at 06:43

## 2022-07-18 RX ADMIN — SENNOSIDES AND DOCUSATE SODIUM 1 TABLET: 50; 8.6 TABLET ORAL at 20:18

## 2022-07-18 RX ADMIN — HYDROCODONE BITARTRATE AND ACETAMINOPHEN 1 TABLET: 5; 325 TABLET ORAL at 01:38

## 2022-07-18 RX ADMIN — PROPOFOL 100 MCG/KG/MIN: 10 INJECTION, EMULSION INTRAVENOUS at 10:08

## 2022-07-18 RX ADMIN — ATROPINE SULFATE 0.4 MG: 0.4 INJECTION, SOLUTION INTRAMUSCULAR; INTRAVENOUS; SUBCUTANEOUS at 10:26

## 2022-07-18 RX ADMIN — Medication 25 MG: at 12:19

## 2022-07-18 RX ADMIN — PHENYLEPHRINE HYDROCHLORIDE 0.1 MCG/KG/MIN: 10 INJECTION INTRAVENOUS at 10:05

## 2022-07-18 RX ADMIN — ROPIVACAINE HYDROCHLORIDE 15 ML: 5 INJECTION, SOLUTION EPIDURAL; INFILTRATION; PERINEURAL at 09:36

## 2022-07-18 RX ADMIN — MIDAZOLAM HYDROCHLORIDE 2 MG: 1 INJECTION, SOLUTION INTRAMUSCULAR; INTRAVENOUS at 09:33

## 2022-07-18 RX ADMIN — KETOROLAC TROMETHAMINE 15 MG: 15 INJECTION, SOLUTION INTRAMUSCULAR; INTRAVENOUS at 21:27

## 2022-07-18 RX ADMIN — SODIUM CHLORIDE, POTASSIUM CHLORIDE, SODIUM LACTATE AND CALCIUM CHLORIDE: 600; 310; 30; 20 INJECTION, SOLUTION INTRAVENOUS at 11:41

## 2022-07-18 RX ADMIN — CELECOXIB 400 MG: 200 CAPSULE ORAL at 09:26

## 2022-07-18 RX ADMIN — MAGNESIUM SULFATE HEPTAHYDRATE 4 G: 4 INJECTION, SOLUTION INTRAVENOUS at 09:26

## 2022-07-18 RX ADMIN — Medication 2 G: at 10:17

## 2022-07-18 RX ADMIN — SODIUM CHLORIDE, POTASSIUM CHLORIDE, SODIUM LACTATE AND CALCIUM CHLORIDE: 600; 310; 30; 20 INJECTION, SOLUTION INTRAVENOUS at 10:40

## 2022-07-18 RX ADMIN — ACETAMINOPHEN 975 MG: 325 TABLET ORAL at 09:25

## 2022-07-18 RX ADMIN — HYDROCODONE BITARTRATE AND ACETAMINOPHEN 1 TABLET: 5; 325 TABLET ORAL at 07:50

## 2022-07-18 RX ADMIN — FENTANYL CITRATE 25 MCG: 50 INJECTION, SOLUTION INTRAMUSCULAR; INTRAVENOUS at 13:15

## 2022-07-18 ASSESSMENT — ACTIVITIES OF DAILY LIVING (ADL)
ADLS_ACUITY_SCORE: 33
ADLS_ACUITY_SCORE: 32
ADLS_ACUITY_SCORE: 32
ADLS_ACUITY_SCORE: 28
ADLS_ACUITY_SCORE: 28
ADLS_ACUITY_SCORE: 29
ADLS_ACUITY_SCORE: 33
ADLS_ACUITY_SCORE: 33
ADLS_ACUITY_SCORE: 28
ADLS_ACUITY_SCORE: 28
ADLS_ACUITY_SCORE: 33
ADLS_ACUITY_SCORE: 32

## 2022-07-18 NOTE — PLAN OF CARE
Goal Outcome Evaluation:    Problem: Pain (Multiple Trauma)  Goal: Acceptable Pain Control  Outcome: Ongoing, Progressing  Intervention: Monitor and Manage Pain  Recent Flowsheet Documentation  Taken 7/17/2022 1730 by Madelyn Preciado RN  Pain Management Interventions:    medication (see MAR)    distraction    cold applied    pain management plan reviewed with patient/caregiver    pillow support provided    repositioned    therapeutic presence       Pt is A&Ox4. VSS except for elevated BP. BP parameter not met for PRN BP med admin. Pain well controlled by PRN medications and non-pharm interventions. Surgery is schedule for 7/18/2022 at 0930. Discharge plan for 7/19/2022 to home pending PT/OT recommendations.

## 2022-07-18 NOTE — PLAN OF CARE
Patient vital signs are at baseline: Yes  Patient able to ambulate as they were prior to admission or with assist devices provided by therapies during their stay:  No,  Reason:  Patient unable to attempt ambulation due to numbness from block being present.  Patient MUST void prior to discharge:  No,  Reason:  Patient due to void.  Patient able to tolerate oral intake:  Yes  Pain has adequate pain control using Oral analgesics:  Yes  Does patient have an identified :  Yes, Moisés Griffin.  Has goal D/C date and time been discussed with patient:  Yes    Ace wrap dressing is CDI. Knee immobilizer applied per MD order. Patient educated on ambulation goal of 75 ft in the first 8 hours post op. Patient is due to void. VSS on room air. Managing post op pain with PRN oxycodone and ice therapy.

## 2022-07-18 NOTE — OR NURSING
Tylenol 975 mg ordered in PIOTR, but pt received norco at 0750. Dr. Isiah LEONG stated to give whole dose 975 in PIOTR as ordered.

## 2022-07-18 NOTE — ANESTHESIA POSTPROCEDURE EVALUATION
Patient: Kristen Graff    Procedure: Procedure(s):  REVISION, TOTAL ARTHROPLASTY, KNEE       Anesthesia Type:  Spinal    Note:  Disposition: Inpatient   Postop Pain Control: Uneventful            Sign Out: Well controlled pain   PONV: No   Neuro/Psych: Uneventful            Sign Out: Acceptable/Baseline neuro status   Airway/Respiratory: Uneventful            Sign Out: Acceptable/Baseline resp. status   CV/Hemodynamics: Uneventful            Sign Out: Acceptable CV status; No obvious hypovolemia; No obvious fluid overload   Other NRE: NONE   DID A NON-ROUTINE EVENT OCCUR? No           Last vitals:  Vitals Value Taken Time   /57 07/18/22 1250   Temp 36.2  C (97.2  F) 07/18/22 1237   Pulse 89 07/18/22 1250   Resp 12 07/18/22 1250   SpO2 98 % 07/18/22 1250   Vitals shown include unvalidated device data.    Electronically Signed By: Terese Joyce MD  July 18, 2022  12:51 PM

## 2022-07-18 NOTE — ANESTHESIA CARE TRANSFER NOTE
Patient: Kristen Graff    Procedure: Procedure(s):  REVISION, TOTAL ARTHROPLASTY, KNEE       Diagnosis: Dislocation of right knee, initial encounter [S83.104A]  Diagnosis Additional Information: No value filed.    Anesthesia Type:   Spinal     Note:      Level of Consciousness: awake      Independent Airway: airway patency satisfactory and stable        Patient transferred to: PACU    Handoff Report: Identifed the Patient, Identified the Reponsible Provider, Reviewed the pertinent medical history, Discussed the surgical course, Reviewed Intra-OP anesthesia mangement and issues during anesthesia, Set expectations for post-procedure period and Allowed opportunity for questions and acknowledgement of understanding      Vitals:  Vitals Value Taken Time   /57 07/18/22 1250   Temp 36.2  C (97.2  F) 07/18/22 1237   Pulse 87 07/18/22 1251   Resp 21 07/18/22 1251   SpO2 98 % 07/18/22 1251   Vitals shown include unvalidated device data.    Electronically Signed By: Terese Joyce MD  July 18, 2022  12:52 PM

## 2022-07-18 NOTE — PLAN OF CARE
Patient vital signs are at baseline: Yes  Patient able to ambulate as they were prior to admission or with assist devices provided by therapies during their stay:  No,  Reason:  bedrest  Patient MUST void prior to discharge:  Yes  Patient able to tolerate oral intake:  Yes  Pain has adequate pain control using Oral analgesics:  Yes  Does patient have an identified :  Yes  Has goal D/C date and time been discussed with patient:  No,  Reason:    Problem: Pain (Multiple Trauma)  Goal: Acceptable Pain Control  Outcome: Ongoing, Progressing     Problem: Neurovascular Compromise (Multiple Trauma)  Goal: Effective Peripheral Tissue Perfusion  Outcome: Ongoing, Progressing     Problem: Fluid and Electrolyte Imbalance (Multiple Trauma)  Goal: Fluid and Electrolyte Balance  Outcome: Ongoing, Progressing   PRN NORCO utilized to control pain. RLE remains splinted. CMS intact. VSS. Surgery today at 0930.

## 2022-07-18 NOTE — BRIEF OP NOTE
[facility]    Brief Operative Note    Pre-operative diagnosis: Dislocation of right knee, initial encounter [S83.104A]  Post-operative diagnosis Same as pre-operative diagnosis    Procedure: Procedure(s):  REVISION, TOTAL ARTHROPLASTY, KNEE  Surgeon: Surgeon(s) and Role:     * Mckay Temple MD - Primary     * Elliot Butler PA-C - Assisting  Anesthesia: Choice   Estimated blood loss: Less than 50 ml  Drains: None  Specimens: None  Findings:   None.  Complications: None.  Implants:   Implant Name Type Inv. Item Serial No.  Lot No. LRB No. Used Action   IMP BONE CEMENT SIMPLEX W/GENTAMICIN 6195-1-001 - [id number] Cement, Bone IMP BONE CEMENT SIMPLEX W/GENTAMICIN 6195-1-001  [employer] [id number] Right 1 Implanted   LEGION OXINIUM CONSTRAINED FEMORAL COMPONENT SIZE 3, RIGHT    [employer] [id number] Right 1 Implanted    LEGION OFFSET 2MM - [id number] Total Joint Component/Insert  LEGION OFFSET 2MM  [employer] [id number] Right 1 Implanted   IMP STEM FEM S&N REV LEGION PRESS FIT [id number] - [id number] Total Joint Component/Insert IMP STEM FEM S&N REV LEGION PRESS FIT [id number]  [employer] [id number] Right 1 Implanted   LEILA II CONSTRAINED ARTICULAR INSERT SIZE 1-2, 25 MM    [employer] [id number] Right 1 Implanted       Plan:    WBAT  F/U in 2 weeks  Leave Aquacel for 10 days  DVT Prophylaxis:  Lovenox in hospital and discharge on ASA 81 mg PO BID for 40 days.

## 2022-07-18 NOTE — OR NURSING
Bone and explants from revision right total knee arthroplasty disposed of in OR per hospital policy.

## 2022-07-18 NOTE — PROGRESS NOTES
"[facility] Medicine PROGRESS NOTE      Identification/Summary:      Kristen Graff is a 57 year old female with a past medical history of obesity, hypothroidism who was admitted on 7/16/2022 for recurrent post operative dislocation. Revision again on 7/18       Assessment & Plan      Hypothyroidism: synthroid   GERD: PPI   R knee s/p revision arthroplasty   Management pr ortho   Clinically Significant Risk Factors Present on Admission                    # Obesity: Estimated body mass index is 37.38 kg/m  as calculated from the following:    Height as of an earlier encounter on 7/16/22: 1.6 m (5' 3\").    Weight as of this encounter: 95.7 kg (211 lb).           Diet: Advance Diet as Tolerated: Regular Diet Adult  DVT Prophylaxis: Lovenox prophylactic dose   Villanueva Catheter: Not present  Central Lines: None    Code Status: Full Code    Discharge Planning   Anticipated Discharge in :1 day  Expected Discharge Destination: Home   Milestones/Criteria For Discharge:-     Disposition Plan     Expected Discharge Date: 07/19/2022        Discharge Comments: TKA revision surgery scheduled for 9:30 am 7/18          The patient's care was discussed with the Bedside Nurse, Patient and Patient's Family.      Interval History/Subjective:     Patient doing well, denies significant, no chest pain or sob   No other issues     Physical Exam/Objective:     Vitals I/O   Vital signs:  Temp: 97.6  F (36.4  C) Temp src: Oral BP: (!) 145/68 Pulse: 68   Resp: 18 SpO2: 95 % O2 Device: None (Room air) Oxygen Delivery: 6 LPM   Weight: 95.7 kg (211 lb)  Estimated body mass index is 37.38 kg/m  as calculated from the following:    Height as of an earlier encounter on 7/16/22: 1.6 m (5' 3\").    Weight as of this encounter: 95.7 kg (211 lb).       I/O last 3 completed shifts:  In: 240 [P.O.:240]  Out: 4000 [Urine:4000]     Vitals:    07/18/22 0623   Weight: 95.7 kg (211 lb)      Weight change:    Body mass index is 37.38 kg/m .    General: " "Awake alert and comfortable  Lungs: CTA without rales or rhonchi  Heart: S1, S2 without murmurs  Abdomen: Soft nontender, bowel sounds positive  CNS: Nonfocal  Extremities: No edema, R knee immbilizer       Medications:     Medications     lactated ringers 100 mL/hr at 07/18/22 1350       acetaminophen  975 mg Oral Q8H     ceFAZolin  2 g Intravenous Q8H     [START ON 7/19/2022] enoxaparin ANTICOAGULANT  40 mg Subcutaneous Q24H     ketorolac  15 mg Intravenous Q6H     levothyroxine  112 mcg Oral Daily     pantoprazole  40 mg Oral QAM AC     [START ON 7/19/2022] polyethylene glycol  17 g Oral Daily     senna-docusate  1 tablet Oral BID     sodium chloride (PF)  3 mL Intracatheter Q8H       Data Reviewed   I personally reviewed all new medications, labs, imaging/diagnostics reports over the past 24 hours.     Pertinent findings include.     Labs    Recent Labs   Lab 07/18/22  0552 07/17/22  1603 07/17/22  0729 07/16/22  2207   WBC  --   --  9.4 10.0   HGB  --  11.7 10.8* 10.6*   MCV  --   --  89 89   PLT  --   --  163 166   NA  --   --  138 141   POTASSIUM 3.9  --  3.8 4.2   CHLORIDE  --   --  101 105   CO2  --   --  28 28   BUN  --   --  10 13   CR  --   --  0.77 0.78   ANIONGAP  --   --  9 8   MAGGIE  --   --  8.7 8.4*   GLC  --   --  98 93   ALBUMIN  --   --  3.4* 3.4*   PROTTOTAL  --   --  6.7 6.4   BILITOTAL  --   --  1.5* 0.9   ALKPHOS  --   --  56 53   ALT  --   --  15 15   AST  --   --  28 28       Imaging   Recent Results (from the past 24 hour(s))   POC US Guidance Needle Placement    Narrative    Ultrasound was performed as guidance to an anesthesia procedure.  Click   \"PACS images\" hyperlink below to view any stored images.  For specific   procedure details, view procedure note authored by anesthesia.   POC US Guidance Needle Placement    Narrative    Ultrasound was performed as guidance to an anesthesia procedure.  Click   \"PACS images\" hyperlink below to view any stored images.  For specific   procedure " details, view procedure note authored by anesthesia.   XR Surgery TOD  Fluoro G/T 5 Min    Narrative    This exam was marked as non-reportable because it will not be read by a   radiologist or a [facility] non-radiologist provider.         XR Knee Port Right 1/2 Views    Narrative    EXAM: XR KNEE PORT RIGHT 1/2 VIEWS  LOCATION: [facility]  DATE/TIME: 7/18/2022 1:05 PM    INDICATION: Right knee postoperative follow-up.  COMPARISON: 7/17/2022.      Impression    IMPRESSION: Interval revision right total knee arthroplasty. The components are well seated without evidence of complication. No fracture or joint malalignment. Postoperative intra-articular and soft tissue gas. Tiny metallic densities projecting over   the joint space and adjacent soft tissues. Anterior skin staples.                    EKG:      Nieves Bullard MD  Internal Medicine / Hospital medicine   [facility]  Securely message with the Vocera Web Console (learn more here)  Text page via [employer] ([employer])

## 2022-07-18 NOTE — ANESTHESIA PROCEDURE NOTES
Intrathecal injection Procedure Note    Pre-Procedure   Staff -        Anesthesiologist:  Terese Joyce MD       Performed By: anesthesiologist       Location: OR       Procedure Start/Stop Times: 7/18/2022 9:56 AM and 7/18/2022 9:58 AM       Pre-Anesthestic Checklist: patient identified, IV checked, risks and benefits discussed, informed consent, monitors and equipment checked, pre-op evaluation, at physician/surgeon's request and post-op pain management  Timeout:       Correct Patient: Yes        Correct Procedure: Yes        Correct Site: Yes        Correct Position: Yes   Procedure Documentation  Procedure: intrathecal injection       Patient Position: sitting       Skin prep: Betadine       Insertion Site: L4-5. (right paramedian approach).       Needle Gauge: 20.        Needle Length (Inches): 4        Spinal Needle Type: Sprotte       Introducer used       # of attempts: 1 and  # of redirects:     Assessment/Narrative         Paresthesias: No.       Sensory Level: T8       CSF fluid: clear.    Medication(s) Administered   0.75% Hyperbaric Bupivacaine (Intrathecal) - Intrathecal   1.6 mL - 7/18/2022 9:56:00 AM  Medication Administration Time: 7/18/2022 9:56 AM

## 2022-07-18 NOTE — ANESTHESIA PREPROCEDURE EVALUATION
Anesthesia Pre-Procedure Evaluation    Patient: Kristen Graff   MRN: [id number] : 1965        Procedure : Procedure(s):  REVISION, TOTAL ARTHROPLASTY, KNEE          Past Medical History:   Diagnosis Date     Arthritis      Leiomyoma of uterus, unspecified      Unspecified hypothyroidism       Past Surgical History:   Procedure Laterality Date     C/SECTION, LOW TRANSVERSE       D & C       HYSTERECTOMY, PAP NO LONGER INDICATED  2008      No Known Allergies   Social History     Tobacco Use     Smoking status: Never Smoker     Smokeless tobacco: Never Used   Substance Use Topics     Alcohol use: No      Wt Readings from Last 1 Encounters:   22 95.7 kg (211 lb)        Anesthesia Evaluation   Pt has had prior anesthetic.     No history of anesthetic complications       ROS/MED HX  ENT/Pulmonary:       Neurologic:       Cardiovascular:       METS/Exercise Tolerance:     Hematologic:       Musculoskeletal:       GI/Hepatic:     (+) GERD,     Renal/Genitourinary:       Endo:     (+) thyroid problem, Obesity,     Psychiatric/Substance Use:       Infectious Disease:       Malignancy:       Other:            Physical Exam    Airway        Mallampati: II    Neck ROM: full     Respiratory Devices and Support         Dental  no notable dental history         Cardiovascular   cardiovascular exam normal          Pulmonary   pulmonary exam normal                OUTSIDE LABS:  CBC:   Lab Results   Component Value Date    WBC 9.4 2022    WBC 10.0 2022    HGB 11.7 2022    HGB 10.8 (L) 2022    HCT 33.3 (L) 2022    HCT 32.4 (L) 2022     2022     2022     BMP:   Lab Results   Component Value Date     2022     2022    POTASSIUM 3.9 2022    POTASSIUM 3.8 2022    CHLORIDE 101 2022    CHLORIDE 105 2022    CO2 28 2022    CO2 28 2022    BUN 10 2022    BUN 13 2022    CR 0.77 2022     CR 0.78 07/16/2022    GLC 98 07/17/2022    GLC 93 07/16/2022     COAGS: No results found for: PTT, INR, FIBR  POC:   Lab Results   Component Value Date    HCG Negative 01/14/2008     HEPATIC:   Lab Results   Component Value Date    ALBUMIN 3.4 (L) 07/17/2022    PROTTOTAL 6.7 07/17/2022    ALT 15 07/17/2022    AST 28 07/17/2022    ALKPHOS 56 07/17/2022    BILITOTAL 1.5 (H) 07/17/2022     OTHER:   Lab Results   Component Value Date    A1C 5.8 02/08/2010    MAGGIE 8.7 07/17/2022    MAG 1.9 07/18/2022    TSH 0.55 03/21/2011    T4 1.00 01/11/2008       Anesthesia Plan    ASA Status:  2      Anesthesia Type: Spinal.              Consents    Anesthesia Plan(s) and associated risks, benefits, and realistic alternatives discussed. Questions answered and patient/representative(s) expressed understanding.     - Discussed: Risks, Benefits and Alternatives for BOTH SEDATION and the PROCEDURE were discussed     - Discussed with:  Patient      - Extended Intubation/Ventilatory Support Discussed: No.      - Patient is DNR/DNI Status: No    Use of blood products discussed: No .     Postoperative Care    Pain management: Multi-modal analgesia, Peripheral nerve block (Single Shot).        Comments:                Terese Joyce MD

## 2022-07-18 NOTE — OP NOTE
Procedure Date: 07/18/2022    PREOPERATIVE DIAGNOSIS:  Failed right total knee due to dislocation.    POSTOPERATIVE DIAGNOSIS:  Failed total knee due to dislocation.    PROCEDURE:  Revision of femoral component and polyethylene insert, right total knee.    SURGEON:  Mckay Temple MD    ASSISTANT:  Elliot Butler PA-C (PA, medically indicated and necessary to allow for safe and adequate progression of surgery).    ESTIMATED BLOOD LOSS:  50 mL    COMPLICATIONS:  None.    SPECIMENS:  None.    ANESTHETIC:  Spinal.    IMPLANTS USED:  Smith and Nephew size 3 revision femur, 2 mm offset , a 13 x 120 press-fit stem and a 25 mm constrained polyethylene insert.    DESCRIPTION OF PROCEDURE:  The patient was brought to the operating room.  After adequate anesthesia was induced, the right lower extremity prepped and draped in the usual sterile fashion.  Previous incision extended proximal and distal about an inch. Did a mid vastus approach, medial parapatellar arthrotomy and then carefully removed each of the sutures from the previous closure.  We went ahead and checked, the knee was relocated.  I went ahead and removed the polyethylene, removed the femur. Used a combination of ACL saw blade, flexible osteotome and offset osteotome to free it up and then ultimately remove it from the distal femur with very minimal bone loss.  Next, we turned attention to the revision aspect of the surgery, reamed up to a 13 with excellent chatter. Placed the size 3 revision cutting guide and found that the 2 mm offset  with it rotated posterior and lateral slightly got that into a proper alignment of the distal femur. Did the chamfer cuts, anterior and posterior cuts in line with the interepicondylar axis 3 degrees external rotation and then proceeded with offset couple reaming to make sure we could get the offset  in place.  This did run into some cortical bone, so I called for an intraoperative x-ray to make sure that there  was adequate space to accept the offset  and there certainly was. No fracture was observed intraoperatively. The stem obviously was well beyond the place where the offset  was getting held up. The reamer was ultimately able to get the offset  positioned so the femur trial was seated flush with the distal femur. Pinned that in place and trialed, worked our way up to a 25 insert, 25 actually gave really nice soft tissue balancing. Flexion and extension gaps matched nicely.  Extramedullary alignment was already checked and it looked good. We constructed the revision femur on the back table with the offset  positioned posterolateral like it was in the trial and then proceeded with mixing the cement, placed antibiotic impregnated cement on the distal femur, all around the distal femur and the offset . Cementing that in place, removing all excess cement as we went helped it to slide into tight position and seated nicely to the distal femur as the trial had sat.  We went ahead and proceeded with cleaning all excess cement, placed the 21 in the knee in extension to let the cement cure and waited 15 minutes for the cement to fully cure.  Once the cemented had cured, I went ahead and locked the 25 insert.  The 21 was just a bit lax, 25 tightened up nicely. Stability felt good, so we irrigated copiously with 2 liters of pulsatile lavage, dried it up carefully, made sure electrocautery hemostasis was obtained and closed the arthrotomy with #1 Ethibond sutures, interrupted figure-of-eight, #1 Vicryl in the VMO fascia, 2-0 Vicryl in the subcutaneous, staples in the skin.  Sterile bandages applied.  The patient was awakened and returned to the postanesthesia care unit in excellent condition.  No apparent intraoperative complications.    Plan is routine total knee protocol.    Mckay Temple MD        D: 07/18/2022   T: 07/18/2022   MT: slime    Name:     JUAN SHOEMAKERAna  MRN:      [id number]         Account:        [id number]   :      1965           Procedure Date: 2022     Document: [id number]

## 2022-07-18 NOTE — ANESTHESIA PROCEDURE NOTES
Adductor canal Procedure Note    Pre-Procedure   Staff -        Anesthesiologist:  Terese Joyce MD       Performed By: anesthesiologist       Location: pre-op       Procedure Start/Stop Times: 7/18/2022 9:36 AM and 7/18/2022 9:38 AM       Pre-Anesthestic Checklist: patient identified, IV checked, site marked, risks and benefits discussed, informed consent, monitors and equipment checked, pre-op evaluation, at physician/surgeon's request and post-op pain management  Timeout:       Correct Patient: Yes        Correct Procedure: Yes        Correct Site: Yes        Correct Position: Yes        Correct Laterality: Yes        Site Marked: Yes  Procedure Documentation  Procedure: Adductor canal       Laterality: right       Patient Position: supine       Skin prep: Chloraprep       Ultrasound guided       1. Ultrasound was used to identify targeted nerve, plexus, vascular marker, or fascial plane and place a needle adjacent to it in real-time.       2. Ultrasound was used to visualize the spread of anesthetic in close proximity to the above referenced structure.       3. A permanent image is entered into the patient's record.    Assessment/Narrative         The placement was negative for: blood aspirated, painful injection and site bleeding       Paresthesias: No.       Bolus given via needle..        Secured via.        Insertion/Infusion Method: Single Shot       Complications: none    Medication(s) Administered   Ropivacaine 0.5% PF (Infiltration) - Infiltration   15 mL - 7/18/2022 9:36:00 AM  Medication Administration Time: 7/18/2022 9:36 AM

## 2022-07-19 ENCOUNTER — APPOINTMENT (OUTPATIENT)
Dept: OCCUPATIONAL THERAPY | Facility: CLINIC | Age: 57
DRG: 468 | End: 2022-07-19
Attending: INTERNAL MEDICINE
Payer: COMMERCIAL

## 2022-07-19 ENCOUNTER — APPOINTMENT (OUTPATIENT)
Dept: PHYSICAL THERAPY | Facility: CLINIC | Age: 57
DRG: 468 | End: 2022-07-19
Attending: INTERNAL MEDICINE
Payer: COMMERCIAL

## 2022-07-19 LAB
GLUCOSE BLD-MCNC: 123 MG/DL (ref 70–125)
HGB BLD-MCNC: 10.3 G/DL (ref 11.7–15.7)
MAGNESIUM SERPL-MCNC: 2 MG/DL (ref 1.8–2.6)
POTASSIUM BLD-SCNC: 4.5 MMOL/L (ref 3.5–5)

## 2022-07-19 PROCEDURE — 99232 SBSQ HOSP IP/OBS MODERATE 35: CPT | Performed by: INTERNAL MEDICINE

## 2022-07-19 PROCEDURE — 97535 SELF CARE MNGMENT TRAINING: CPT | Mod: GO

## 2022-07-19 PROCEDURE — 84132 ASSAY OF SERUM POTASSIUM: CPT | Performed by: INTERNAL MEDICINE

## 2022-07-19 PROCEDURE — 97116 GAIT TRAINING THERAPY: CPT | Mod: GP

## 2022-07-19 PROCEDURE — 85018 HEMOGLOBIN: CPT | Performed by: ORTHOPAEDIC SURGERY

## 2022-07-19 PROCEDURE — 36415 COLL VENOUS BLD VENIPUNCTURE: CPT | Performed by: ORTHOPAEDIC SURGERY

## 2022-07-19 PROCEDURE — 97166 OT EVAL MOD COMPLEX 45 MIN: CPT | Mod: GO

## 2022-07-19 PROCEDURE — 250N000013 HC RX MED GY IP 250 OP 250 PS 637: Performed by: INTERNAL MEDICINE

## 2022-07-19 PROCEDURE — 120N000001 HC R&B MED SURG/OB

## 2022-07-19 PROCEDURE — 250N000011 HC RX IP 250 OP 636: Performed by: ORTHOPAEDIC SURGERY

## 2022-07-19 PROCEDURE — 250N000013 HC RX MED GY IP 250 OP 250 PS 637: Performed by: ORTHOPAEDIC SURGERY

## 2022-07-19 PROCEDURE — 97110 THERAPEUTIC EXERCISES: CPT | Mod: GP

## 2022-07-19 PROCEDURE — 82947 ASSAY GLUCOSE BLOOD QUANT: CPT | Performed by: ORTHOPAEDIC SURGERY

## 2022-07-19 PROCEDURE — 97161 PT EVAL LOW COMPLEX 20 MIN: CPT | Mod: GP

## 2022-07-19 PROCEDURE — 83735 ASSAY OF MAGNESIUM: CPT | Performed by: INTERNAL MEDICINE

## 2022-07-19 RX ADMIN — SENNOSIDES AND DOCUSATE SODIUM 1 TABLET: 50; 8.6 TABLET ORAL at 20:25

## 2022-07-19 RX ADMIN — ACETAMINOPHEN 975 MG: 325 TABLET ORAL at 17:10

## 2022-07-19 RX ADMIN — KETOROLAC TROMETHAMINE 15 MG: 15 INJECTION, SOLUTION INTRAMUSCULAR; INTRAVENOUS at 02:50

## 2022-07-19 RX ADMIN — CEFAZOLIN SODIUM 2 G: 2 INJECTION, SOLUTION INTRAVENOUS at 02:10

## 2022-07-19 RX ADMIN — ENOXAPARIN SODIUM 40 MG: 100 INJECTION SUBCUTANEOUS at 08:11

## 2022-07-19 RX ADMIN — OXYCODONE HYDROCHLORIDE 10 MG: 5 TABLET ORAL at 08:31

## 2022-07-19 RX ADMIN — HYDROXYZINE HYDROCHLORIDE 25 MG: 25 TABLET, FILM COATED ORAL at 09:57

## 2022-07-19 RX ADMIN — OXYCODONE HYDROCHLORIDE 5 MG: 5 TABLET ORAL at 21:20

## 2022-07-19 RX ADMIN — PANTOPRAZOLE SODIUM 40 MG: 20 TABLET, DELAYED RELEASE ORAL at 06:31

## 2022-07-19 RX ADMIN — KETOROLAC TROMETHAMINE 15 MG: 15 INJECTION, SOLUTION INTRAMUSCULAR; INTRAVENOUS at 09:57

## 2022-07-19 RX ADMIN — ACETAMINOPHEN 975 MG: 325 TABLET ORAL at 02:09

## 2022-07-19 RX ADMIN — LEVOTHYROXINE SODIUM 112 MCG: 0.11 TABLET ORAL at 08:16

## 2022-07-19 RX ADMIN — OXYCODONE HYDROCHLORIDE 5 MG: 5 TABLET ORAL at 13:20

## 2022-07-19 RX ADMIN — SENNOSIDES AND DOCUSATE SODIUM 1 TABLET: 50; 8.6 TABLET ORAL at 08:11

## 2022-07-19 RX ADMIN — KETOROLAC TROMETHAMINE 15 MG: 15 INJECTION, SOLUTION INTRAMUSCULAR; INTRAVENOUS at 15:13

## 2022-07-19 RX ADMIN — OXYCODONE HYDROCHLORIDE 5 MG: 5 TABLET ORAL at 17:10

## 2022-07-19 RX ADMIN — ACETAMINOPHEN 975 MG: 325 TABLET ORAL at 09:57

## 2022-07-19 ASSESSMENT — ACTIVITIES OF DAILY LIVING (ADL)
ADLS_ACUITY_SCORE: 29
ADLS_ACUITY_SCORE: 29
ADLS_ACUITY_SCORE: 27
ADLS_ACUITY_SCORE: 29
ADLS_ACUITY_SCORE: 27
ADLS_ACUITY_SCORE: 29
ADLS_ACUITY_SCORE: 27
ADLS_ACUITY_SCORE: 29

## 2022-07-19 NOTE — PROGRESS NOTES
07/19/22 0730   Quick Adds   Type of Visit Initial PT Evaluation   Living Environment   People in Home spouse   Current Living Arrangements house   Home Accessibility stairs to enter home;stairs within home   Number of Stairs, Main Entrance 2   Number of Stairs, Within Home, Primary five   Living Environment Comments Can stay on one level once up initial stairs   Self-Care   Usual Activity Tolerance good   Current Activity Tolerance moderate   Equipment Currently Used at Home walker, rolling   Fall history within last six months no   General Information   Onset of Illness/Injury or Date of Surgery 07/16/22   Referring Physician Nieves Bullard MD   Patient/Family Therapy Goals Statement (PT) Improve confidence with mobility   Pertinent History of Current Problem (include personal factors and/or comorbidities that impact the POC) dislocation s/p TKA, revision   Existing Precautions/Restrictions no pivoting or twisting;weight bearing;other (see comments)  (No knee hyperextension)   Weight-Bearing Status - LLE full weight-bearing   Weight-Bearing Status - RLE weight-bearing as tolerated   Range of Motion (ROM)   ROM Comment WFL, decreased s/p TKA revision and precautions   Strength (Manual Muscle Testing)   Strength Comments WFL, decreased overall   Transfers   Transfers sit-stand transfer   Sit-Stand Transfer   Sit-Stand [place] (Transfers) contact guard   Gait/Stairs (Locomotion)   [place] Level (Gait) contact guard   Assistive Device (Gait) walker, front-wheeled   Distance in Feet (Required for LE Total Joints) 10'   Pattern (Gait) step-to   Deviations/Abnormal Patterns (Gait) vaishali decreased;gait speed decreased;weight shifting decreased   Clinical Impression   Criteria for Skilled Therapeutic Intervention Yes, treatment indicated   PT Diagnosis (PT) Impaired functional mobility   Influenced by the following impairments Weakness, pain   Functional limitations due to impairments Transfers, gait, stairs    Clinical Presentation (PT Evaluation Complexity) Stable/Uncomplicated   Clinical Presentation Rationale Pt presents as medically diagnosed   Clinical Decision Making (Complexity) low complexity   Planned Therapy Interventions (PT) gait training;home exercise program;patient/family education;ROM (range of motion);stair training;strengthening   Anticipated Equipment Needs at Discharge (PT) walker, rolling   Risk & Benefits of therapy have been explained care plan/treatment goals reviewed;patient   PT Discharge Planning   PT Discharge Recommendation (DC Rec) (S)  home with assist;home with outpatient physical therapy   PT Rationale for DC Rec Pending further progression with mobility and stair trial tomorrow, pt has spouse at home to assist as needed, will start back up with OP PT in 2-3 weeks   Plan of Care Review   Plan of Care Reviewed With patient   Total Evaluation Time   Total Evaluation Time (Minutes) 10   Physical Therapy Goals   PT Frequency Daily   PT Predicted Duration/Target Date for Goal Attainment 07/20/22   PT Goals Transfers;Gait;Stairs;PT Goal 1   PT: Transfers Supervision/stand-by assist;Sit to/from stand   PT: Gait Supervision/stand-by assist;Rolling walker;100 feet   PT: Stairs Supervision/stand-by assist;4 stairs;Rail on both sides   PT: Goal 1 Independent with HEP for TKA rehabilitation

## 2022-07-19 NOTE — PROGRESS NOTES
07/19/22 0830   Living Environment   People in Home alone  (boyfriend will be able to stay with her initially upon discharge.)   Current Living Arrangements house  (Able to stay on one floor.)   Living Environment Comments Tub/shower with GB and shower chair, Std toilet with vanity on the R.  Also has a commode.   Self-Care   Usual Activity Tolerance good   Current Activity Tolerance moderate   Equipment Currently Used at Home walker, rolling;cane, straight   General Information   Onset of Illness/Injury or Date of Surgery 07/16/22   Referring Physician Dr. Jonny Gil   Patient/Family Therapy Goal Statement (OT) To return home tomorrow.   Existing Precautions/Restrictions (S)  other (see comments)  (knee immobilizer on with wt br.)   Right Lower Extremity (Weight-bearing Status) weight-bearing as tolerated (WBAT)  (with immobilizer on.)   Cognitive Status Examination   Orientation Status orientation to person, place and time   Visual Perception   Visual Impairment/Limitations corrective lenses full-time   Bed Mobility   Bed Mobility sit-supine   Sit-Supine [place] (Bed Mobility) minimum assist (75% patient effort)   Assistive Device (Bed Mobility) other (see comments)  (hold immobilizer to lift R leg into the bed.)   Transfers   Transfers bed-chair transfer;sit-stand transfer;toilet transfer   Transfer Skill: Bed to Chair/Chair to Bed   Bed-Chair [place] (Transfers) supervision;verbal cues   Assistive Device (Bed-Chair Transfers) rolling walker   Sit-Stand Transfer   Sit-Stand [place] (Transfers) supervision;verbal cues   Assistive Device (Sit-Stand Transfers) walker, front-wheeled   Toilet Transfer   Type (Toilet Transfer) sit-stand;stand-sit   [place] Level (Toilet Transfer) supervision;verbal cues   Assistive Device (Toilet Transfer) grab bars/safety frame   Toilet Transfer Comments using trash can under R foot to support leg in immobilizer when sitting on the toilet.   Activities of  Daily Living   BADL Assessment/Intervention upper body dressing;lower body dressing;grooming;toileting   Upper Body Dressing Assessment/Training   [place] Level (Upper Body Dressing)   (deferred as Pt will not discharge until tomorrow.)   Lower Body Dressing Assessment/Training   Position (Lower Body Dressing) supported sitting;supported standing   Assistive Devices (Lower Body Dressing) reacher   [place] Level (Lower Body Dressing) minimum assist (75% patient effort)   Grooming Assessment/Training   Position (Grooming) sink side   [place] Level (Grooming) supervision;verbal cues   Toileting   Position (Toileting) supported sitting;supported standing   Assistive Devices (Toileting) grab bar, toilet   [place] Level (Toileting) supervision;verbal cues   Clinical Impression   Criteria for Skilled Therapeutic Interventions Met (OT) Yes, treatment indicated   OT Diagnosis decreased indep with ADLs due to rev of the R TKA   OT Problem List-Impairments impacting ADL mobility   Assessment of Occupational Performance 3-5 Performance Deficits   Identified Performance Deficits dressing, trsfs, toileting, bathing   Planned Therapy Interventions (OT) ADL retraining   Clinical Decision Making Complexity (OT) moderate complexity   Anticipated Equipment Needs Upon Discharge (OT) reacher;other (see comments)  (leg )   Risk & Benefits of therapy have been explained patient;evaluation/treatment results reviewed   OT Discharge Planning   OT Discharge Recommendation (DC Rec) home with assist   OT Rationale for DC Rec boyfriend to assist at home.   Total Evaluation Time (Minutes)   Total Evaluation Time (Minutes) 15   OT Goals   Therapy Frequency (OT) 2 times/day   OT Predicted Duration/Target Date for Goal Attainment 07/21/22   OT Goals Lower Body Dressing;Transfers   OT: Lower Body Dressing Modified independent   OT: Transfer Modified independent

## 2022-07-19 NOTE — PLAN OF CARE
Problem: Functional Ability Impaired (Multiple Trauma)  Goal: Optimal Functional Ability  Outcome: Ongoing, Not Progressing     Problem: Neurovascular Compromise (Knee Arthroplasty)  Goal: Intact Neurovascular Status  Outcome: Ongoing, Progressing   Goal Outcome Evaluation:          Patient vital signs are at baseline: Yes  Patient able to ambulate as they were prior to admission or with assist devices provided by therapies during their stay:  No,  Reason:  not ambulating, AX1 pivot to commode  Patient MUST void prior to discharge:  Yes  Patient able to tolerate oral intake:  Yes  Pain has adequate pain control using Oral analgesics:  Yes  Does patient have an identified :  Yes  Has goal D/C date and time been discussed with patient:  Yes    Denies pain. Plan to work with PT/OT to gain confidence with ambulation. Pt refused ambulation overnight, stating she didn't feel ready and wanted to work with PT before attempting ambulation on her own. Plan to discharge Wednesday pending PT/OT. Immobilizer in place.

## 2022-07-19 NOTE — PLAN OF CARE
Problem: Functional Ability Impaired (Knee Arthroplasty)  Goal: Optimal Functional Ability  Intervention: Promote Optimal Functional Status  Recent Flowsheet Documentation  Taken 7/18/2022 2031 by Royer Eckert RN  Assistive Device Utilized:   gait belt   walker  Activity Management:   activity adjusted per tolerance   activity encouraged  Taken 7/18/2022 1640 by Royer Eckert RN  Activity Management:   activity adjusted per tolerance   activity encouraged   Goal Outcome Evaluation:        Able to transfer to bedside commode and recliner with assistance of one, walker and transfer belt. Tolerating regular diet, drinking fluids without any issues, saline locked. Reports numbness to right leg, no pain. Able to void

## 2022-07-19 NOTE — PROGRESS NOTES
"[facility] Medicine PROGRESS NOTE      Identification/Summary:      Kristen Graff is a 57 year old female with a past medical history of obesity, hypothroidism who was admitted on 7/16/2022 for recurrent post operative dislocation. Revision again on 7/18       Assessment & Plan      Hypothyroidism: synthroid   GERD: PPI   R knee s/p revision arthroplasty   Increased pain this AM after PT, had ketorolac and oxycodone   Continue current pain regimen and adjust as needed   PT/OT   Clinically Significant Risk Factors Present on Admission                # Obesity: Estimated body mass index is 37.38 kg/m  as calculated from the following:    Height as of an earlier encounter on 7/16/22: 1.6 m (5' 3\").    Weight as of this encounter: 95.7 kg (211 lb).           Diet: Advance Diet as Tolerated: Regular Diet Adult  DVT Prophylaxis: Lovenox prophylactic dose   Villanueva Catheter: Not present  Central Lines: None    Code Status: Full Code    Discharge Planning   Anticipated Discharge in :1 day  Expected Discharge Destination: Home   Milestones/Criteria For Discharge:-     Disposition Plan      Expected Discharge Date: 07/20/2022        Discharge Comments: TKA revision surgery scheduled for 9:30 am 7/18          The patient's care was discussed with the Bedside Nurse, Patient and Patient's Family.      Interval History/Subjective:     Worked with PT this AM, increased pain, no n/v/d   No other issues     Physical Exam/Objective:     Vitals I/O   Vital signs:  Temp: 98.5  F (36.9  C) Temp src: Oral BP: 115/70 Pulse: 89   Resp: 16 SpO2: 96 % O2 Device: None (Room air) Oxygen Delivery: 6 LPM   Weight: 95.7 kg (211 lb)  Estimated body mass index is 37.38 kg/m  as calculated from the following:    Height as of an earlier encounter on 7/16/22: 1.6 m (5' 3\").    Weight as of this encounter: 95.7 kg (211 lb).       I/O last 3 completed shifts:  In: 3890 [P.O.:1140; I.V.:2750]  Out: 1450 [Urine:1450]     Vitals:    07/18/22 0623 "   Weight: 95.7 kg (211 lb)      Weight change:    Body mass index is 37.38 kg/m .    General: Awake alert and comfortable  Lungs: CTA without rales or rhonchi  Heart: S1, S2 without murmurs  Abdomen: Soft nontender, bowel sounds positive  CNS: Nonfocal  Extremities: No edema, R knee immbilizer       Medications:     Medications     lactated ringers 100 mL/hr at 07/18/22 1350       acetaminophen  975 mg Oral Q8H     enoxaparin ANTICOAGULANT  40 mg Subcutaneous Q24H     ketorolac  15 mg Intravenous Q6H     levothyroxine  112 mcg Oral Daily     pantoprazole  40 mg Oral QAM AC     polyethylene glycol  17 g Oral Daily     senna-docusate  1 tablet Oral BID     sodium chloride (PF)  3 mL Intracatheter Q8H       Data Reviewed   I personally reviewed all new medications, labs, imaging/diagnostics reports over the past 24 hours.     Pertinent findings include.     Labs    Recent Labs   Lab 07/19/22  0546 07/18/22  1424 07/18/22  0552 07/17/22  1603 07/17/22  0729 07/16/22  2207   WBC  --   --   --   --  9.4 10.0   HGB 10.3*  --   --  11.7 10.8* 10.6*   MCV  --   --   --   --  89 89   PLT  --   --   --   --  163 166   NA  --   --   --   --  138 141   POTASSIUM 4.5  --  3.9  --  3.8 4.2   CHLORIDE  --   --   --   --  101 105   CO2  --   --   --   --  28 28   BUN  --   --   --   --  10 13   CR  --  0.74  --   --  0.77 0.78   ANIONGAP  --   --   --   --  9 8   MAGGIE  --   --   --   --  8.7 8.4*     --   --   --  98 93   ALBUMIN  --   --   --   --  3.4* 3.4*   PROTTOTAL  --   --   --   --  6.7 6.4   BILITOTAL  --   --   --   --  1.5* 0.9   ALKPHOS  --   --   --   --  56 53   ALT  --   --   --   --  15 15   AST  --   --   --   --  28 28       Imaging   Recent Results (from the past 24 hour(s))   XR Surgery TOD  Fluoro G/T 5 Min    Narrative    This exam was marked as non-reportable because it will not be read by a   radiologist or a [facility] non-radiologist provider.         XR Knee Port Right 1/2 Views    Narrative    EXAM:  XR KNEE PORT RIGHT 1/2 VIEWS  LOCATION: [facility]  DATE/TIME: 7/18/2022 1:05 PM    INDICATION: Right knee postoperative follow-up.  COMPARISON: 7/17/2022.      Impression    IMPRESSION: Interval revision right total knee arthroplasty. The components are well seated without evidence of complication. No fracture or joint malalignment. Postoperative intra-articular and soft tissue gas. Tiny metallic densities projecting over   the joint space and adjacent soft tissues. Anterior skin staples.                    EKG:      Nieves Bullard MD  Internal Medicine / Hospital medicine   [facility]  Securely message with the Vocera Web Console (learn more here)  Text page via [employer] ([employer])

## 2022-07-19 NOTE — PROGRESS NOTES
Care Management Follow Up    Length of Stay (days): 2    Expected Discharge Date: 07/20/2022     Concerns to be Addressed:       Patient plan of care discussed at interdisciplinary rounds: Yes    Anticipated Discharge Disposition: Home     Anticipated Discharge Services:    Anticipated Discharge DME:      Patient/family educated on Medicare website which has current facility and service quality ratings:    Education Provided on the Discharge Plan:    Patient/Family in Agreement with the Plan:      Referrals Placed by CM/SW:    Private pay costs discussed: Not applicable    Additional Information:  Chart assessment completed.  Pt lives in private residence with spouse.  Pt plans to return home at discharge with outpatient PT.  Care Management available in any discharge needs.      NICOLE Odonnell

## 2022-07-19 NOTE — PLAN OF CARE
Patient vital signs are at baseline: Yes  Patient able to ambulate as they were prior to admission or with assist devices provided by therapies during their stay:  Yes  Patient MUST void prior to discharge:  Yes  Patient able to tolerate oral intake:  Yes  Pain has adequate pain control using Oral analgesics:  Yes  Does patient have an identified :  Yes  Has goal D/C date and time been discussed with patient:  Yes , tomorrow morning

## 2022-07-19 NOTE — PROGRESS NOTES
Occupational Therapy Discharge Summary    Reason for therapy discharge:    All goals and outcomes met, no further needs identified.    Progress towards therapy goal(s). See goals on Care Plan in [facility] electronic health record for goal details.  Goals met    Therapy recommendation(s):    No further therapy is recommended.

## 2022-07-19 NOTE — PROGRESS NOTES
"[facility] Orthopaedics Progress Note      Post-operative Day: 1 Day Post-Op    Procedure(s):  REVISION, TOTAL ARTHROPLASTY, KNEE        Plan: Anticoagulation protocol: scoanticoagulationlist2: Lovenox 40 mg daily  Inpatient then ASA 81mg BID x 40  days            Pain medications:  scopainmedication: oxycodone, toradol, tylenol and vistaril.  Acute Blood Loss Anemia: mild lightheadedness, but also possibly related to low blood sugar this morning after not having a significant breakfast.             Weight bearing status:  WBAT            Disposition:  Home tomorrow after passing therapies.             Continue cares and rehabilitation     Subjective:  Grace is a pleasant 57 year old who originally had a R total knee arthroplasty in an outpatient setting (7/14) and unfortunately had her PCL rupture and a knee dislocation on 7/15.   After reduction and wearing a knee immobilizer, the patient was leaning forward and dislocated her knee again on 7/16.   Patient was seen at [facility] then admitted to  for management pre-reduction.   Revision completed on 7/18/22.     Patient has worked with therapies this morning and is in good spirits, stating \"my confidence is coming back little bit by little bit.\"   Pain: moderate, block is still present causing numbness to the RLE   Chest pain, SOB:  No  Denies lightheadedness/dizziness  Denies nausea/ vomiting  Passing flatus  voiding well    Objective:  Blood pressure 115/70, pulse 89, temperature 98.5  F (36.9  C), temperature source Oral, resp. rate 16, weight 95.7 kg (211 lb), last menstrual period 03/21/2007, SpO2 96 %.    Patient Vitals for the past 24 hrs:   BP Temp Temp src Pulse Resp SpO2   07/19/22 0719 115/70 98.5  F (36.9  C) Oral 89 16 96 %   07/19/22 0045 118/62 98.9  F (37.2  C) Oral 88 16 94 %   07/18/22 2045 110/68 97.6  F (36.4  C) Oral 90 18 95 %   07/18/22 1645 136/68 97.9  F (36.6  C) Oral 68 18 98 %   07/18/22 1545 132/65 97.9  F (36.6  C) Oral 73 18 98 % "   07/18/22 1445 122/67 97.8  F (36.6  C) Oral 67 18 97 %   07/18/22 1415 (!) 145/68 97.6  F (36.4  C) Oral 68 18 95 %   07/18/22 1345 133/78 -- -- 69 18 100 %   07/18/22 1330 128/66 -- -- 75 19 98 %   07/18/22 1320 119/68 97.6  F (36.4  C) Temporal 70 12 99 %   07/18/22 1310 113/64 -- -- 77 20 100 %   07/18/22 1300 112/59 -- -- 81 18 98 %   07/18/22 1250 107/57 -- -- 89 18 98 %   07/18/22 1240 103/57 -- -- 106 18 99 %   07/18/22 1237 103/57 97.2  F (36.2  C) Temporal 101 18 98 %       Wt Readings from Last 4 Encounters:   07/18/22 95.7 kg (211 lb)   07/16/22 94.8 kg (209 lb)   08/18/10 95.3 kg (210 lb)   02/08/10 96.6 kg (213 lb)         Motor function, sensation, and circulation intact   Yes  Wound status: ACE wrap is clean, dry, and intact. Yes  Calf tenderness: Bilateral  No    Pertinent Labs   Lab Results: personally reviewed.     Recent Labs   Lab Test 07/19/22  0546 07/17/22  1603 07/17/22  0729 07/16/22  2207   HGB 10.3* 11.7 10.8* 10.6*   HCT  --   --  33.3* 32.4*   MCV  --   --  89 89   PLT  --   --  163 166   NA  --   --  138 141       Report completed by:  Jorge Alberto Aguiar NP  Date: 7/19/2022

## 2022-07-20 ENCOUNTER — APPOINTMENT (OUTPATIENT)
Dept: PHYSICAL THERAPY | Facility: CLINIC | Age: 57
DRG: 468 | End: 2022-07-20
Attending: INTERNAL MEDICINE
Payer: COMMERCIAL

## 2022-07-20 VITALS
DIASTOLIC BLOOD PRESSURE: 71 MMHG | BODY MASS INDEX: 38.55 KG/M2 | TEMPERATURE: 98.3 F | HEART RATE: 89 BPM | WEIGHT: 217.6 LBS | OXYGEN SATURATION: 98 % | RESPIRATION RATE: 16 BRPM | SYSTOLIC BLOOD PRESSURE: 137 MMHG

## 2022-07-20 LAB
GLUCOSE BLD-MCNC: 106 MG/DL (ref 70–125)
HGB BLD-MCNC: 10.1 G/DL (ref 11.7–15.7)
MAGNESIUM SERPL-MCNC: 1.7 MG/DL (ref 1.8–2.6)
POTASSIUM BLD-SCNC: 4.5 MMOL/L (ref 3.5–5)

## 2022-07-20 PROCEDURE — 85018 HEMOGLOBIN: CPT | Performed by: ORTHOPAEDIC SURGERY

## 2022-07-20 PROCEDURE — 36415 COLL VENOUS BLD VENIPUNCTURE: CPT | Performed by: ORTHOPAEDIC SURGERY

## 2022-07-20 PROCEDURE — 99238 HOSP IP/OBS DSCHRG MGMT 30/<: CPT | Performed by: INTERNAL MEDICINE

## 2022-07-20 PROCEDURE — 82947 ASSAY GLUCOSE BLOOD QUANT: CPT | Performed by: INTERNAL MEDICINE

## 2022-07-20 PROCEDURE — 83735 ASSAY OF MAGNESIUM: CPT | Performed by: INTERNAL MEDICINE

## 2022-07-20 PROCEDURE — 84132 ASSAY OF SERUM POTASSIUM: CPT | Performed by: INTERNAL MEDICINE

## 2022-07-20 PROCEDURE — 250N000011 HC RX IP 250 OP 636: Performed by: ORTHOPAEDIC SURGERY

## 2022-07-20 PROCEDURE — 250N000013 HC RX MED GY IP 250 OP 250 PS 637: Performed by: ORTHOPAEDIC SURGERY

## 2022-07-20 PROCEDURE — 250N000013 HC RX MED GY IP 250 OP 250 PS 637: Performed by: INTERNAL MEDICINE

## 2022-07-20 PROCEDURE — 97116 GAIT TRAINING THERAPY: CPT | Mod: GP

## 2022-07-20 RX ORDER — OXYCODONE HYDROCHLORIDE 5 MG/1
5-10 TABLET ORAL EVERY 4 HOURS PRN
Qty: 35 TABLET | Refills: 0 | Status: SHIPPED | OUTPATIENT
Start: 2022-07-20

## 2022-07-20 RX ORDER — AMOXICILLIN 250 MG
1 CAPSULE ORAL 2 TIMES DAILY
Refills: 0 | COMMUNITY
Start: 2022-07-20

## 2022-07-20 RX ORDER — ACETAMINOPHEN 325 MG/1
975 TABLET ORAL EVERY 8 HOURS
Refills: 0 | COMMUNITY
Start: 2022-07-20

## 2022-07-20 RX ORDER — HYDROXYZINE HYDROCHLORIDE 25 MG/1
25 TABLET, FILM COATED ORAL EVERY 6 HOURS PRN
Qty: 30 TABLET | Refills: 0 | Status: SHIPPED | OUTPATIENT
Start: 2022-07-20

## 2022-07-20 RX ADMIN — OXYCODONE HYDROCHLORIDE 5 MG: 5 TABLET ORAL at 06:37

## 2022-07-20 RX ADMIN — ACETAMINOPHEN 975 MG: 325 TABLET ORAL at 09:02

## 2022-07-20 RX ADMIN — HYDROXYZINE HYDROCHLORIDE 25 MG: 25 TABLET, FILM COATED ORAL at 09:03

## 2022-07-20 RX ADMIN — SENNOSIDES AND DOCUSATE SODIUM 1 TABLET: 50; 8.6 TABLET ORAL at 09:03

## 2022-07-20 RX ADMIN — PANTOPRAZOLE SODIUM 40 MG: 20 TABLET, DELAYED RELEASE ORAL at 06:37

## 2022-07-20 RX ADMIN — ACETAMINOPHEN 975 MG: 325 TABLET ORAL at 00:52

## 2022-07-20 RX ADMIN — OXYCODONE HYDROCHLORIDE 5 MG: 5 TABLET ORAL at 00:53

## 2022-07-20 RX ADMIN — LEVOTHYROXINE SODIUM 112 MCG: 0.11 TABLET ORAL at 09:03

## 2022-07-20 RX ADMIN — OXYCODONE HYDROCHLORIDE 5 MG: 5 TABLET ORAL at 11:04

## 2022-07-20 RX ADMIN — ENOXAPARIN SODIUM 40 MG: 100 INJECTION SUBCUTANEOUS at 09:03

## 2022-07-20 ASSESSMENT — ACTIVITIES OF DAILY LIVING (ADL)
ADLS_ACUITY_SCORE: 27

## 2022-07-20 NOTE — DISCHARGE SUMMARY
[facility] MEDICINE  DISCHARGE SUMMARY     Primary Care Physician: Erica Anna  Admission Date: 7/16/2022   Discharge Provider: Nieves Bullard MD Discharge Date: 7/20/2022   Diet:   Active Diet and Nourishment Order   Procedures     Advance Diet as Tolerated: Regular Diet Adult     Discharge Instruction - Regular Diet Adult       Code Status: Full Code   Activity: Per orthopedic orders, see below        Condition at Discharge: Stable     REASON FOR PRESENTATION(See Admission Note for Details)   Dislocation of the right knee    PRINCIPAL & ACTIVE DISCHARGE DIAGNOSES     Principal Problem:    Dislocation closed, knee  Active Problems:    Hypothyroidism    Esophageal reflux    Status post total hip replacement, right    Primary osteoarthritis of both knees    H/O echocardiogram    Elevated blood pressure reading without diagnosis of hypertension    Failed total knee, right, initial encounter (H)      PENDING LABS     Unresulted Labs Ordered in the Past 30 Days of this Admission     No orders found from 6/16/2022 to 7/17/2022.            PROCEDURES ( this hospitalization only)      Procedure(s):  REVISION, TOTAL ARTHROPLASTY, KNEE RIGHT    RECOMMENDATIONS TO OUTPATIENT PROVIDER FOR F/U VISIT     Follow-up Appointments     Follow Up Care      Follow-up with Dr. Temple/ TATI Jennings in 10-14 days. Call [phone]   to make or change your appointment, or to connect with an on-call provider   after hours. If you have a specific question for Samantha or Jerson with Dr. Temple's care team during business hours, please call [phone].         Physical Therapy Instructions      Begin physical therapy within one week following surgery.                 DISPOSITION     Home    SUMMARY OF HOSPITAL COURSE:      57-year-old female with hypothyroidism had right knee arthroplasty outpatient, PCL rupture knee dislocation on 7/15/2022, had reduction, was wearing a knee immobilizer and the as she  was leaning forward had dislocated knee again on 7/16/2022.  Admitted, underwent revision arthroplasty.  Postoperatively she is doing well.  DVT prophylaxis: Aspirin 81 mg twice daily x40 days      Discharge Medications with Med changes:     Current Discharge Medication List      START taking these medications    Details   aspirin (ASA) 81 MG EC tablet Take 1 tablet (81 mg) by mouth 2 times daily for 40 days Take 1 aspirin twice a day for 40 days to prevent blood clots. Take with food. Do not take at same time as other anti-inflammatories, like ibuprofen or celebrex.  Qty: 80 tablet, Refills: 0      hydrOXYzine (ATARAX) 25 MG tablet Take 1 tablet (25 mg) by mouth every 6 hours as needed for other (adjuvant pain)  Qty: 30 tablet, Refills: 0      senna-docusate (SENOKOT-S/PERICOLACE) 8.6-50 MG tablet Take 1 tablet by mouth 2 times daily Take while taking opioid medications and until bowels are back to normal routine. Hold for loose stools  Refills: 0         CONTINUE these medications which have CHANGED    Details   acetaminophen (TYLENOL) 325 MG tablet Take 3 tablets (975 mg) by mouth every 8 hours Take for the next 7 to 10 days as prescribed to provide you with optimal pain control. Alternate with your pain medication for better relief.  Refills: 0      oxyCODONE (ROXICODONE) 5 MG tablet Take 1-2 tablets (5-10 mg) by mouth every 4 hours as needed for breakthrough pain or moderate to severe pain Take 1 pill for moderate pain (4-6/10) and 2 pills for severe pain (7-10/10). Alternate with Tylenol.  Qty: 35 tablet, Refills: 0         CONTINUE these medications which have NOT CHANGED    Details   ibuprofen (ADVIL/MOTRIN) 800 MG tablet Take 800 mg by mouth every 8 hours as needed for moderate pain      levothyroxine (SYNTHROID) 112 MCG tablet Take 1 tablet by mouth daily.  Qty: 93 tablet, Refills: 2    Comments: Due for physical in August  Associated Diagnoses: Unspecified hypothyroidism      omeprazole 20 MG tablet Take  20 mg by mouth daily      polyethylene glycol (MIRALAX) 17 g packet Take 1 packet by mouth daily         STOP taking these medications       aspirin (ASA) 81 MG chewable tablet Comments:   Reason for Stopping:                     Rationale for medication changes:      Oxycodone for pain        Consults       ORTHOPEDIC SURGERY IP CONSULT  HOSPITALIST IP CONSULT  PHYSICAL THERAPY ADULT IP CONSULT  OCCUPATIONAL THERAPY ADULT IP CONSULT    Immunizations given this encounter     Most Recent Immunizations   Administered Date(s) Administered     COVID-19,PF,Moderna 12/20/2021     Influenza (IIV3) PF 12/14/2005     TD (ADULT, 7+) 06/01/2005           Anticoagulation Information      Recent INR results: No results for input(s): INR in the last 168 hours.  Warfarin doses (if applicable) or name of other anticoagulant:       SIGNIFICANT IMAGING FINDINGS     Results for orders placed or performed during the hospital encounter of 07/16/22   XR Knee Port Right 1/2 Views    Impression    IMPRESSION: Cemented right total knee arthroplasty. Acute posterior dislocation of the tibia under the femoral condyles. No fracture. Components appear well seated without evidence of loosening. Skin staples in place.   XR Knee Port Right 1/2 Views    Impression    IMPRESSION: Interval revision right total knee arthroplasty. The components are well seated without evidence of complication. No fracture or joint malalignment. Postoperative intra-articular and soft tissue gas. Tiny metallic densities projecting over   the joint space and adjacent soft tissues. Anterior skin staples.                  SIGNIFICANT LABORATORY FINDINGS     Most Recent 3 CBC's:Recent Labs   Lab Test 07/20/22  0612 07/19/22  0546 07/17/22  1603 07/17/22  0729 07/16/22  2207   WBC  --   --   --  9.4 10.0   HGB 10.1* 10.3* 11.7 10.8* 10.6*   MCV  --   --   --  89 89   PLT  --   --   --  163 166     Most Recent 3 BMP's:Recent Labs   Lab Test 07/20/22  0612 07/19/22  0546  "07/18/22  1424 07/18/22  0552 07/17/22  0729 07/16/22  2207   NA  --   --   --   --  138 141   POTASSIUM 4.5 4.5  --  3.9 3.8 4.2   CHLORIDE  --   --   --   --  101 105   CO2  --   --   --   --  28 28   BUN  --   --   --   --  10 13   CR  --   --  0.74  --  0.77 0.78   ANIONGAP  --   --   --   --  9 8   MAGGIE  --   --   --   --  8.7 8.4*    123  --   --  98 93           Discharge Orders        Reason for your hospital stay    Total knee arthroplasty revision     When to call - Contact Surgeon Team    You may experience symptoms that require follow-up before your scheduled appointment. Refer to the \"Stoplight Tool\" for instructions on when to contact your Surgeon Team if you are concerned about pain control, blood clots, constipation, or if you are unable to urinate.     When to call - Reach out to Urgent Care    If you are not able to reach your Surgeon Team and you need immediate care, go to the Orthopedic Walk-in Clinic or Urgent Care at your Surgeon's office.  Do NOT go to the Emergency Room unless you have shortness of breath, chest pain, or other signs of a medical emergency.     When to call - Reasons to Call 911    Call 911 immediately if you experience sudden-onset chest pain, arm weakness/numbness, slurred speech, or shortness of breath     Discharge Instruction - Breathing exercises    Perform breathing exercises using your Incentive Spirometer 10 times per 2 hours while awake for 2 weeks.     Symptoms - Fever Management    A low grade fever can be expected after surgery.  Use acetaminophen (TYLENOL) as needed for fever management.  Contact your Surgeon Team if you have a fever greater than 101.5 F, chills, and/or night sweats.     Symptoms - Constipation management    Constipation (hard, dry bowel movements) is expected after surgery due to the combination of being less active, the anesthetic, and the opioid pain medication.  You can do the following to help reduce constipation:  ~  FLUIDS:  Drink " clear liquids (water or Gatorade), or juice (apple/prune).  ~  DIET:  Eat a fiber rich diet.    ~  ACTIVITY:  Get up and move around several times a day.  Increase your activity as you are able.  MEDICATIONS:  Reduce the risk of constipation by starting medications before you are constipated.  You can take Miralax   (1 packet as directed) and/or a stool softener (Senokot 1-2 tablets 1-2 times a day).  If you already have constipation and these medications are not working, you can get magnesium citrate and use as directed.  If you continue to have constipation you can try an over the counter suppository or enema.  Call your Surgeon Team if it has been greater than 3 days since your last bowel movement.     Symptoms - Reduced Urine Output    Changes in the amount of fluids you drank before and after surgery may result in problems urinating.  It is important to stay well-hydrated after surgery and drink plenty of water. If it has been greater than 8 hours since you have urinated despite drinking plenty of water, call your Surgeon Team.     Medication instructions - Anticoagulation - other    Take the aspirin as prescribed for a total of 40 days after surgery.  This is taken to help minimize your risk of blood clots. After 40 days, resume 81 mg aspirin daily.     Activity - Exercises to prevent blood clots    Unless otherwise directed by your Surgeon team, perform the following exercises at least three times per day for the first four weeks after surgery to prevent blood clots in your legs: 1) Point and flex your feet (Ankle Pumps), 2) Move your ankle around in big circles, 3) Wiggle your toes, 4) Walk, even for short distances, several times a day, will help decrease the risk of blood clots.     Comfort and Pain Management - Pain after Surgery    Pain after surgery is normal and expected.  You will have some amount of pain for several weeks after surgery.  Your pain will improve with time.  There are several things you  "can do to help reduce your pain including: rest, ice, elevation, and using pain medications as needed. Contact your Surgeon Team if you have pain that persists or worsens after surgery despite rest, ice, elevation, and taking your medication(s) as prescribed. Contact your Surgeon Team if you have new numbness, tingling, or weakness in your operative extremity.     Comfort and Pain Management - Swelling after Surgery    Swelling and/or bruising of the surgical extremity is common and may persist for several months after surgery. In addition to frequent icing and elevation, gentle compressive support with an ACE wrap or tubigrip may help with swelling. Apply compression regularly, removing at least twice daily to perform skin checks. Contact your Surgeon Team if your swelling increases and is NOT associated with an increase in your activity level, or if your swelling increases and is associated with redness and pain.     Comfort and Pain Management - LOWER Extremity Elevation    Swelling is expected for several months after surgery. This type of swelling is usually associated with gravity and activity, and can be improved with elevation.   The best way to do this is to get your \"toes above your nose\" by laying down and placing several pillows lengthwise under your calf and heel. When elevating your leg keep your knee completely straight. Perform this elevation as often as possible especially for the first two weeks after surgery.     Comfort and Pain Management - Cold therapy    Ice can be used to control swelling and discomfort after surgery. Place a thin towel over your operative site and apply the ice pack overtop. Leave ice pack in place for 20 minutes, then remove for 20 minutes. Repeat this 20 minutes on/20 minutes off routine as often as tolerated.     Medication Instructions - Acetaminophen (TYLENOL) Instructions    You were discharged with acetaminophen (TYLENOL) for pain management after surgery. " Acetaminophen most effectively manages pain symptoms when it is taken on a schedule without missing doses (every four, six, or eight hours). Your Provider will prescribe a safe daily dose between 3000 - 4000 mg.  Do NOT exceed this daily dose. Most patients use acetaminophen for pain control for the first four weeks after surgery.  You can wean from this medication as your pain decreases.     Medication Instructions - NSAID Instructions    You were discharged with an anti-inflammatory medication for pain management to use in combination with acetaminophen (TYLENOL) and the narcotic pain medication.  Take this medication exactly as directed.  You should only take one anti-inflammatory at a time.  Some common anti-inflammatories include: ibuprofen (ADVIL, MOTRIN), naproxen (ALEVE, NAPROSYN), celecoxib (CELEBREX).  Take this medication with food and water.     Medication Instructions - Opioid Instructions (1 - 2 tablets Q 4-6 hours, MAX 6 tablets)    You were discharged with an opioid medication (hydromorphone, oxycodone, hydrocodone, or tramadol). This medication should only be taken for breakthrough pain that is not controlled with acetaminophen (TYLENOL). If you rate your pain less than 3 you do not need this medication.  Pain rating 0-3:  You do not need this medication.  Pain rating 4-6:  Take 1 tablet every 4-6 hours as needed  Pain rating 7-10:  Take 2 tablets every 4-6 hours as needed.  Do not exceed 6 tablets per day     Medication Instructions - Opioids - Tapering Instructions    In the first three days following surgery, your symptoms may warrant use of the narcotic pain medication every four to six hours as prescribed. This is normal. As your pain symptoms improve, focus your efforts on decreasing (tapering) use of narcotic medications. The most successful tapering strategy is to first, decrease the number of tablets you take every 4-6 hours to the minimum prescribed. Then, increase the amount of time  between doses.  For example:  First, taper to   or 1 tablet every 4-6 hours.  Then, taper to   or 1 tablet every 6-8 hours.  Then, taper to   or 1 tablet every 8-10 hours.  Then, taper to   or 1 tablet every 10-12 hours.  Then, taper to   or 1 tablet at bedtime.  The bedtime dose can help with comfort during sleep and is typically the last dose to be discontinued after surgery.     Medication Instructions - Muscle relaxant Medication Instructions    You were discharged with a muscle relaxer medication that can be used in conjunction with acetaminophen (TYLENOL) and the narcotic medication to manage pain symptoms. Take the muscle relaxant medication exactly as directed.     Follow Up Care    Follow-up with Dr. Temple/ TATI Jennings in 10-14 days. Call [phone] to make or change your appointment, or to connect with an on-call provider after hours. If you have a specific question for Samantha or Jerson with Dr. Temple's care team during business hours, please call [phone].     Physical Therapy Instructions    Begin physical therapy within one week following surgery.     Activity - Total Knee Arthroplasty     Return to Driving    Return to driving - Driving is NOT permitted until directed by your provider. Under no circumstance are you permitted to drive while using narcotic pain medications.     NO Precautions    No precautions directed by your Provider.  You may perform range of motion activities as tolerated.     Weight bearing as tolerated    Weight bearing as tolerated on your operative extremity.     Dressing / Wound Care - Wound    You have a clean dressing on your surgical wound. Dressing change instructions as follows: remove your dressing in 10 days, and leave incision open to air. Contact your Surgeon Team if you have increased redness, warmth around the surgical wound, and/or drainage from the surgical wound.     Dressing Wound Care - Shower with wound/dressing NOT covered    You do not need to cover your  dressing or incision in the shower, you may allow water and soap to run over top of the surgical dressing or incision. You may shower 2 days after surgery.  You are strictly prohibited from soaking or submerging the surgical wound underwater.     Dressing / Wound Care - NO Tub Bathing    Tub bathing, swimming, or any other activities that will cause your incision to be submerged in water should be avoided until allowed by your Surgeon.     Knee Immobilizer    Remove knee immobilizer twice daily for skin assessment and hygiene. Wear knee immobilizer for all ambulation, skin assessment and hygiene. When removing, ensure that you are safely seated or lying in bed. May bend knee to flexion tolerance.     Crutches DME    DME Documentation: Describe the reason for need to support medical necessity: Impaired gait status post knee surgery. I, the undersigned, certify that the above prescribed supplies are medically necessary for this patient and is both reasonable and necessary in reference to accepted standards of medical practice in the treatment of this patient's condition and is not prescribed as a convenience.     Cane DME    DME Documentation: Describe the reason for need to support medical necessity: Impaired gait status post knee surgery. I, the undersigned, certify that the above prescribed supplies are medically necessary for this patient and is both reasonable and necessary in reference to accepted standards of medical practice in the treatment of this patient's condition and is not prescribed as a convenience.     Walker DME    DME Documentation: Describe the reason for need to support medical necessity: Impaired gait status post knee surgery. I, the undersigned, certify that the above prescribed supplies are medically necessary for this patient and is both reasonable and necessary in reference to accepted standards of medical practice in the treatment of this patient's condition and is not prescribed as a  convenience.     Discharge Instruction - Regular Diet Adult    Return to your pre-surgery diet unless instructed otherwise       Examination   Physical Exam   Temp:  [98.2  F (36.8  C)-98.5  F (36.9  C)] 98.3  F (36.8  C)  Pulse:  [85-89] 89  Resp:  [16-18] 16  BP: (133-137)/(67-71) 137/71  SpO2:  [95 %-98 %] 98 %  Wt Readings from Last 1 Encounters:   07/20/22 98.7 kg (217 lb 9.6 oz)       General: Awake alert and comfortable  Lungs: CTA without rales or rhonchi  Heart: S1, S2 without murmurs  Abdomen: Soft nontender, bowel sounds positive  CNS: Nonfocal  Extremities: No edema, R knee immobilizer         Please see EMR for more detailed significant labs, imaging, consultant notes etc.    INieves MD, personally saw the patient today and spent less than or equal to 30 minutes discharging this patient.    Nieves Bullard MD  [facility]    CC:Wilfrido, Erica Max

## 2022-07-20 NOTE — DISCHARGE INSTRUCTIONS
Erica Anna MD    General - Family Medicine    [phone]   It is recommended to follow up with your primary care provider in 7 days    Dr Temple   [facility]   [phone]

## 2022-07-20 NOTE — PROGRESS NOTES
[facility] Orthopaedics Progress Note      Post-operative Day: 2 Day Post-Op    Procedure(s):  REVISION, TOTAL ARTHROPLASTY, KNEE RIGHT      Plan: Anticoagulation protocol: scoanticoagulationlist2: Lovenox 40 mg daily  Inpatient then ASA 81mg BID x 40  days            Pain medications:  scopainmedication: oxycodone, toradol, tylenol and vistaril.  Acute Blood Loss Anemia: not symptomatic this morning. Patient does report low appetite but likely related to variety of factors, and improving.             Weight bearing status:  WBAT            Disposition:  Home today after passing therapies.             Continue cares and rehabilitation     Subjective:  Grace is a pleasant 57 year old who originally had a R total knee arthroplasty in an outpatient setting (7/14) and unfortunately had her PCL rupture and a knee dislocation on 7/15.   After reduction and wearing a knee immobilizer, the patient was leaning forward and dislocated her knee again on 7/16.   Patient was seen at [facility] then admitted to  for management pre-reduction.   Revision completed on 7/18/22.     Patient has passed therapies and is again in good spirits, reporting improved confidence.   Pain: moderate, numbness still present in RLE, but patient reports feeling able to stand confidently on her feet.   Chest pain, SOB:  No  Denies lightheadedness/dizziness  Denies nausea/ vomiting  Passing flatus  voiding well    Objective:  Blood pressure 137/71, pulse 89, temperature 98.3  F (36.8  C), temperature source Oral, resp. rate 16, weight 98.7 kg (217 lb 9.6 oz), last menstrual period 03/21/2007, SpO2 98 %.    Patient Vitals for the past 24 hrs:   BP Temp Temp src Pulse Resp SpO2 Weight   07/20/22 0752 137/71 98.3  F (36.8  C) Oral 89 16 98 % --   07/20/22 0555 -- -- -- -- -- -- 98.7 kg (217 lb 9.6 oz)   07/20/22 0022 137/67 98.2  F (36.8  C) -- 88 18 95 % --   07/19/22 1640 133/68 98.5  F (36.9  C) Oral 85 16 97 % --       Wt Readings from Last 4 Encounters:    07/20/22 98.7 kg (217 lb 9.6 oz)   07/16/22 94.8 kg (209 lb)   08/18/10 95.3 kg (210 lb)   02/08/10 96.6 kg (213 lb)         Motor function, sensation, and circulation intact   Yes  Wound status: Aquacel is clean, dry, and intact. Yes ACE wrap removed this morning.   Calf tenderness: Bilateral  No    Pertinent Labs   Lab Results: personally reviewed.     Recent Labs   Lab Test 07/19/22  0546 07/17/22  1603 07/17/22  0729 07/16/22  2207   HGB 10.3* 11.7 10.8* 10.6*   HCT  --   --  33.3* 32.4*   MCV  --   --  89 89   PLT  --   --  163 166   NA  --   --  138 141       Report completed by:  Jorge Alberto Aguiar NP  Date: 7/20/2022

## 2022-07-20 NOTE — PLAN OF CARE
Patient vital signs are at baseline: Yes  Patient able to ambulate as they were prior to admission or with assist devices provided by therapies during their stay:  Yes  Patient MUST void prior to discharge:  Yes  Patient able to tolerate oral intake:  Yes  Pain has adequate pain control using Oral analgesics:  Yes  Does patient have an identified :  Yes  Has goal D/C date and time been discussed with patient:  Yes    Patient's right knee pain managed with scheduled Tylenol and PRN Oxycodone. Passed OT during the afternoon. Dressing remains c/d/i with hinge brace on. Ambulating with SBA, VSS. Anticipated discharge 7/20.

## 2022-07-20 NOTE — PROGRESS NOTES
Physical Therapy Discharge Summary    Reason for therapy discharge:    Discharged to home with outpatient therapy.  All goals and outcomes met, no further needs identified.    Progress towards therapy goal(s). See goals on Care Plan in [facility] electronic health record for goal details.  Goals met    Therapy recommendation(s):    Continue home exercise program.

## 2022-07-20 NOTE — PLAN OF CARE
Patient vital signs are at baseline: Yes  Patient able to ambulate as they were prior to admission or with assist devices provided by therapies during their stay:  Yes  Patient MUST void prior to discharge:  Yes  Patient able to tolerate oral intake:  Yes  Pain has adequate pain control using Oral analgesics:  Yes  Does patient have an identified :  Yes  Has goal D/C date and time been discussed with patient:  Yes  Goal Outcome Evaluation:      Pt reports mild/moderate pain in right knee, managed with scheduled Tylenol and PRN Oxycodone. Dressing c/d/i. Hinge brace on. Sleeping between cares. Pt had shower in the morning. Discharging home today.

## 2022-08-08 NOTE — ADDENDUM NOTE
Addendum  created 08/08/22 1138 by Terese Joyce MD    Intraprocedure Event edited, Intraprocedure Staff edited

## 2022-08-21 ENCOUNTER — HEALTH MAINTENANCE LETTER (OUTPATIENT)
Age: 57
End: 2022-08-21

## 2022-11-21 ENCOUNTER — HEALTH MAINTENANCE LETTER (OUTPATIENT)
Age: 57
End: 2022-11-21

## 2023-09-16 ENCOUNTER — HEALTH MAINTENANCE LETTER (OUTPATIENT)
Age: 58
End: 2023-09-16

## 2024-08-31 ENCOUNTER — HEALTH MAINTENANCE LETTER (OUTPATIENT)
Age: 59
End: 2024-08-31

## 2024-11-09 ENCOUNTER — HEALTH MAINTENANCE LETTER (OUTPATIENT)
Age: 59
End: 2024-11-09

## (undated) DEVICE — CUSTOM PACK TOTAL KNEE ACCESSORY SOP5BTAHEA

## (undated) DEVICE — PLATE GROUNDING ADULT W/CORD 9165L

## (undated) DEVICE — HOLDER LIMB VELCRO OR 0814-1533

## (undated) DEVICE — GLOVE BIOGEL INDICATOR 7.5 LF 41675

## (undated) DEVICE — GLOVE BIOGEL PI INDICATOR 8.0 LF 41680

## (undated) DEVICE — GLOVE BIOGEL PI SZ 8.0 40880

## (undated) DEVICE — SUCTION MANIFOLD NEPTUNE 2 SYS 4 PORT 0702-020-000

## (undated) DEVICE — SOL WATER IRRIG 1000ML BOTTLE 2F7114

## (undated) DEVICE — DRAPE C-ARM 60X42" 1013

## (undated) DEVICE — CUSTOM PACK TOTAL KNEE SOP5BTKHEC

## (undated) DEVICE — DRSG TEGADERM 4X4 3/4" 1626W

## (undated) DEVICE — A3 SUPPLIES- SEE NURSING INFO PAGE

## (undated) DEVICE — STPL SKIN PROXIMATE 35 WIDE PMW35

## (undated) DEVICE — DRSG AQUACEL AG 3.5X12" HYDROFIBER 420670

## (undated) DEVICE — GLOVE BIOGEL PI SZ 7.0 40870

## (undated) DEVICE — SOL NACL 0.9% IRRIG 1000ML BOTTLE 2F7124

## (undated) DEVICE — BLADE SAW SAGITTAL STRK DUAL CUT 4118-135-090

## (undated) DEVICE — SOL NACL 0.9% INJ 1000ML BAG 2B1324X

## (undated) RX ORDER — EPHEDRINE SULFATE 50 MG/ML
INJECTION, SOLUTION INTRAMUSCULAR; INTRAVENOUS; SUBCUTANEOUS
Status: DISPENSED
Start: 2022-07-18

## (undated) RX ORDER — PROPOFOL 10 MG/ML
INJECTION, EMULSION INTRAVENOUS
Status: DISPENSED
Start: 2022-07-16

## (undated) RX ORDER — CEFAZOLIN SODIUM 1 G/3ML
INJECTION, POWDER, FOR SOLUTION INTRAMUSCULAR; INTRAVENOUS
Status: DISPENSED
Start: 2022-07-18